# Patient Record
Sex: MALE | Race: WHITE
[De-identification: names, ages, dates, MRNs, and addresses within clinical notes are randomized per-mention and may not be internally consistent; named-entity substitution may affect disease eponyms.]

---

## 2019-08-01 ENCOUNTER — HOSPITAL ENCOUNTER (EMERGENCY)
Age: 49
Discharge: HOME | End: 2019-08-01
Payer: COMMERCIAL

## 2019-08-01 PROCEDURE — 99499 UNLISTED E&M SERVICE: CPT

## 2021-12-18 ENCOUNTER — HOSPITAL ENCOUNTER (OUTPATIENT)
Dept: HOSPITAL 47 - EC | Age: 51
Setting detail: OBSERVATION
LOS: 2 days | Discharge: HOME | End: 2021-12-20
Attending: FAMILY MEDICINE | Admitting: FAMILY MEDICINE
Payer: COMMERCIAL

## 2021-12-18 DIAGNOSIS — Z88.8: ICD-10-CM

## 2021-12-18 DIAGNOSIS — I10: ICD-10-CM

## 2021-12-18 DIAGNOSIS — K76.0: ICD-10-CM

## 2021-12-18 DIAGNOSIS — I72.3: ICD-10-CM

## 2021-12-18 DIAGNOSIS — Z20.822: ICD-10-CM

## 2021-12-18 DIAGNOSIS — T46.5X6A: ICD-10-CM

## 2021-12-18 DIAGNOSIS — Z91.19: ICD-10-CM

## 2021-12-18 DIAGNOSIS — M79.605: ICD-10-CM

## 2021-12-18 DIAGNOSIS — I71.4: ICD-10-CM

## 2021-12-18 DIAGNOSIS — Z91.128: ICD-10-CM

## 2021-12-18 DIAGNOSIS — R79.1: ICD-10-CM

## 2021-12-18 DIAGNOSIS — M79.604: ICD-10-CM

## 2021-12-18 DIAGNOSIS — K80.20: ICD-10-CM

## 2021-12-18 DIAGNOSIS — N28.89: ICD-10-CM

## 2021-12-18 DIAGNOSIS — R79.89: ICD-10-CM

## 2021-12-18 DIAGNOSIS — I16.0: Primary | ICD-10-CM

## 2021-12-18 DIAGNOSIS — D75.89: ICD-10-CM

## 2021-12-18 DIAGNOSIS — M54.9: ICD-10-CM

## 2021-12-18 DIAGNOSIS — I71.01: ICD-10-CM

## 2021-12-18 DIAGNOSIS — Z86.79: ICD-10-CM

## 2021-12-18 LAB
ALBUMIN SERPL-MCNC: 3.4 G/DL (ref 3.5–5)
ALP SERPL-CCNC: 92 U/L (ref 38–126)
ALT SERPL-CCNC: 34 U/L (ref 4–49)
ANION GAP SERPL CALC-SCNC: 12 MMOL/L
APTT BLD: 26.7 SEC (ref 22–30)
AST SERPL-CCNC: 158 U/L (ref 17–59)
BASOPHILS # BLD AUTO: 0 K/UL (ref 0–0.2)
BASOPHILS NFR BLD AUTO: 1 %
BUN SERPL-SCNC: <2 MG/DL (ref 9–20)
CALCIUM SPEC-MCNC: 8.4 MG/DL (ref 8.4–10.2)
CHLORIDE SERPL-SCNC: 106 MMOL/L (ref 98–107)
CO2 SERPL-SCNC: 26 MMOL/L (ref 22–30)
EOSINOPHIL # BLD AUTO: 0.1 K/UL (ref 0–0.7)
EOSINOPHIL NFR BLD AUTO: 2 %
ERYTHROCYTE [DISTWIDTH] IN BLOOD BY AUTOMATED COUNT: 4.28 M/UL (ref 4.3–5.9)
ERYTHROCYTE [DISTWIDTH] IN BLOOD: 15.1 % (ref 11.5–15.5)
GLUCOSE SERPL-MCNC: 127 MG/DL (ref 74–99)
HCT VFR BLD AUTO: 46.3 % (ref 39–53)
HGB BLD-MCNC: 16.1 GM/DL (ref 13–17.5)
INR PPP: 1.2 (ref ?–1.2)
LYMPHOCYTES # SPEC AUTO: 1.7 K/UL (ref 1–4.8)
LYMPHOCYTES NFR SPEC AUTO: 30 %
MCH RBC QN AUTO: 37.7 PG (ref 25–35)
MCHC RBC AUTO-ENTMCNC: 34.9 G/DL (ref 31–37)
MCV RBC AUTO: 108.1 FL (ref 80–100)
MONOCYTES # BLD AUTO: 0.4 K/UL (ref 0–1)
MONOCYTES NFR BLD AUTO: 7 %
NEUTROPHILS # BLD AUTO: 3.2 K/UL (ref 1.3–7.7)
NEUTROPHILS NFR BLD AUTO: 58 %
PLATELET # BLD AUTO: 141 K/UL (ref 150–450)
POTASSIUM SERPL-SCNC: 4 MMOL/L (ref 3.5–5.1)
PROT SERPL-MCNC: 6.6 G/DL (ref 6.3–8.2)
PT BLD: 12.1 SEC (ref 9–12)
SODIUM SERPL-SCNC: 144 MMOL/L (ref 137–145)
WBC # BLD AUTO: 5.5 K/UL (ref 3.8–10.6)

## 2021-12-18 PROCEDURE — 85730 THROMBOPLASTIN TIME PARTIAL: CPT

## 2021-12-18 PROCEDURE — 71275 CT ANGIOGRAPHY CHEST: CPT

## 2021-12-18 PROCEDURE — 83036 HEMOGLOBIN GLYCOSYLATED A1C: CPT

## 2021-12-18 PROCEDURE — 74174 CTA ABD&PLVS W/CONTRAST: CPT

## 2021-12-18 PROCEDURE — 85610 PROTHROMBIN TIME: CPT

## 2021-12-18 PROCEDURE — 96375 TX/PRO/DX INJ NEW DRUG ADDON: CPT

## 2021-12-18 PROCEDURE — 85025 COMPLETE CBC W/AUTO DIFF WBC: CPT

## 2021-12-18 PROCEDURE — 96374 THER/PROPH/DIAG INJ IV PUSH: CPT

## 2021-12-18 PROCEDURE — 80053 COMPREHEN METABOLIC PANEL: CPT

## 2021-12-18 PROCEDURE — 85379 FIBRIN DEGRADATION QUANT: CPT

## 2021-12-18 PROCEDURE — 84484 ASSAY OF TROPONIN QUANT: CPT

## 2021-12-18 PROCEDURE — 36415 COLL VENOUS BLD VENIPUNCTURE: CPT

## 2021-12-18 PROCEDURE — 80048 BASIC METABOLIC PNL TOTAL CA: CPT

## 2021-12-18 PROCEDURE — 93970 EXTREMITY STUDY: CPT

## 2021-12-18 PROCEDURE — 87635 SARS-COV-2 COVID-19 AMP PRB: CPT

## 2021-12-18 PROCEDURE — 99285 EMERGENCY DEPT VISIT HI MDM: CPT

## 2021-12-18 NOTE — CT
EXAMINATION TYPE: CT angio abdomen pelvis

 

DATE OF EXAM: 12/18/2021

 

COMPARISON: None

 

HISTORY: Bilateral leg pain. History of AAA repair.

 

CT DLP: 1329.2 mGycm

Automated exposure control for dose reduction was used.

 

CONTRAST: 

Performed with IV Contrast, patient injected with 100ml mL of Isovue 370.

 

There are 3-D post processed images.

 

There is fatty infiltration of the liver. The bile ducts are not dilated. There are calcified small g
allstones. Spleen is intact. The stomach is intact. There is no pancreatic mass.

 

There is no adrenal mass. Kidneys show satisfactory contrast opacification. There is no hydronephrosi
s. There is 2.6 cm mixed density mass upper pole right kidney. This could be angiomyolipoma.

 

There is no mesenteric edema. There is no ascites or free air. There is no bowel obstruction. Appendi
x is normal.

 

The lumbar vertebra have normal alignment. Posterior elements are intact. There is no compression fra
cture. Bony pelvis is intact.

 

There is dissection of the thoracic aorta and the abdominal aorta. The true lumen is anterior and on 
the right side and relatively small. The dissection extends from the thoracic aorta to the common leeann
ac arteries bilaterally. Dissection extends into the right internal and external iliac arteries as we
ll. There is arterial flow in both femoral arteries. No evidence of hemodynamic stenosis of the femor
al and iliac arteries.

 

There is arterial flow in the superior mesenteric artery. There appears to be thrombosis in the super
ior mesenteric artery 6 cm from the origin. 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 There i
s arterial flow in both renal arteries without evidence of hemodynamic stenosis.

 

IMPRESSION:

There is dissection of the abdominal aorta extending into the iliac arteries bilaterally as above. Th
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

There is there is also dissection extending into the superior mesenteric artery. 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
clusion of the celiac artery.

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
y.

 

Multiple small gallstones. Fatty infiltration of the liver.

 

Mixed density mass appears to contain some fat in the upper pole right kidney could be angiomyolipoma
 or atypical cyst.

## 2021-12-18 NOTE — US
EXAMINATION TYPE: US venous doppler duplex LE 

 

DATE OF EXAM: 12/18/2021 12:58 PM

 

COMPARISON: NONE

 

CLINICAL HISTORY: BLE edema and pain. EC patient with leg pain, especially calf pain; Wears compressi
on stocking left lower leg.

 

SIDE PERFORMED: Bilateral  

 

TECHNIQUE:  The lower extremity deep venous system is examined utilizing real time linear array sonog
segun with graded compression, doppler sonography and color-flow sonography.

 

VESSELS IMAGED:

Common Femoral Vein

Deep Femoral Vein

Greater Saphenous Vein *

Femoral Vein

Popliteal Vein

Small Saphenous Vein *

Proximal Calf Veins

(* superficial vessels)

 

 

Right Leg:  Negative for DVT

 

Left Leg:  Negative for DVT

 

IMPRESSION:  Grayscale, color doppler, spectral doppler imaging performed of the deep veins of the lo
wer extremities.  There is normal flow, compressibility, vascular waveforms

 

No DVT of the bilateral lower extremities.

## 2021-12-18 NOTE — HP
HISTORY AND PHYSICAL



CHIEF COMPLAINT:

Pain in the lower extremities.



HISTORY OF PRESENT ILLNESS:

This is another admission for this gentleman who was treated in the past for

hypertension.  He has a history going back to 2014, when he had a surgical procedure

which probably was related to the ascending aorta.  He might have had a dissection.  He

is unclear of the history and I do not have all the details.  He does have a history of

significant hypertension and has not been seen since May of 2019.  He has been on no

medications of late.  At that time he was on clonidine, losartan, _____, labetalol and

SBE prophylaxis.  He presented to the emergency room with pain in the lower

extremities, where he had no significant evidence of vascular insufficiency, neurologic

insufficiency, swelling, etc.  In the emergency room his blood pressure was 194/109.



REVIEW OF SYSTEMS:

He denies any headache, neurologic deficits, change in the vision or the hearing, chest

pain, cough, hemoptysis, orthopnea, PND, abdominal pain, nausea, vomiting, diarrhea,

melena, hematochezia, jaundice, hepatitis, cirrhosis, hematuria, frequency, urgency,

incontinence, diabetes, etc.



Past medical history, family history, and personal and social histories are all

otherwise essentially unremarkable except for what was already mentioned.  He CANNOT

TAKE HYDRALAZINE.  He has been told in the past that he had a mass on one kidney.  He

is not a smoker.



PHYSICAL EXAMINATION:

Blood pressure is 190/99 with a pulse of 91, respirations of 32.  He is afebrile. In

general he appeared to be well developed, well nourished, in no acute distress.  Skin

color is normal. Skin is warm and dry. Lymph nodes are not enlarged.  Head, ears, eyes,

nose, mouth and throat are normal. Neck veins are not distended.  Thyroid is not

enlarged.  Chest is clear.  Cardiac exam demonstrates sinus tachycardia. The abdomen is

soft and non-tender. Extremities are normal.  There is no edema.  He has normal motor

function and he seems to have palpable pulses. Neurologically he is intact.



He is admitted to the hospital with the diagnoses:

1. Pain in the lower extremities.

2. Hypertension; hypertensive urgency.

3. History of aortic dissection.



PLAN:

1. Bedrest.

2. D-dimer.

3. Control hypertension.

4. Rule out aortic disease.

5. Rule out vascular disease of lower extremities.





MMODL / IJN: [id number] / Job#: [id number]

## 2021-12-18 NOTE — ED
General Adult HPI





<Scar Anaya - Last Filed: 12/18/21 22:37>





- General


Source: patient, RN notes reviewed


Mode of arrival: ambulatory


Limitations: no limitations





<Haritha Brito - Last Filed: 12/18/21 23:33>





- General


Chief complaint: Extremity Injury, Lower


Stated complaint: leg pin


Time Seen by Provider: 12/18/21 11:31





- History of Present Illness


Initial comments: 





51-year-old male presents to the emergency Department with complaints of 

bilateral lower extremity pain that has been an ongoing issue for several 

months, but has worsened over the past week and a half to 2 weeks.  Pain is not 

localized, but is worse in the upper portion of the legs; no aggravating or 

alleviating factors.  Describes pain as a constant discomfort similar to a 

headache or toothache. Able to ambulate without difficulty.  States he does not 

like taking medications therefore has only attempted to treat his symptoms with 

topical agents.  Patient denies injury or trauma, no prolonged immobilization or

recent travel, activity changes, fever or chills, chest pain or shortness of 

breath, abdominal pain, or back pain. (Haritha Brito)





- Related Data


                                Home Medications











 Medication  Instructions  Recorded  Confirmed


 


Aspirin EC [Ecotrin Low Dose] 81 mg PO DAILY PRN 12/18/21 12/18/21











                                    Allergies











Allergy/AdvReac Type Severity Reaction Status Date / Time


 


hydralazine Allergy  Unknown Verified 12/18/21 16:58


 


unknown b/p med AdvReac  joint pain Uncoded 12/18/21 14:21














Review of Systems


ROS Other: All systems not noted in ROS Statement are negative.





<Scar Anaya - Last Filed: 12/18/21 22:37>


ROS Other: All systems not noted in ROS Statement are negative.





<Haritha Brito - Last Filed: 12/18/21 23:33>


ROS Statement: 


Those systems with pertinent positive or pertinent negative responses have been 

documented in the HPI.








Past Medical History


Past Medical History: Hypertension


Additional Past Medical History / Comment(s): AAA


History of Any Multi-Drug Resistant Organisms: None Reported


Additional Past Surgical History / Comment(s): AAA repair


Past Psychological History: No Psychological Hx Reported


Smoking Status: Never smoker


Past Alcohol Use History: Occasional


Past Drug Use History: Marijuana





<Haritha Brito - Last Filed: 12/18/21 23:33>





General Exam


Limitations: no limitations (Well-developed, well-nourished male in no acute 

distress.  Initial temperature 98.1, pulse 108, respirations 18, blood pressure 

193/107, pulse ox 99% on room air)


General appearance: alert, in no apparent distress


Head exam: Present: atraumatic, normocephalic, normal inspection


Neck exam: Present: normal inspection.  Absent: tenderness, meningismus, lym

phadenopathy


Respiratory exam: Present: normal lung sounds bilaterally.  Absent: respiratory 

distress, wheezes, rales, rhonchi, stridor


Cardiovascular Exam: Present: regular rate, normal rhythm, normal heart sounds. 

Absent: systolic murmur, diastolic murmur, rubs, gallop, clicks


GI/Abdominal exam: Present: soft, normal bowel sounds.  Absent: distended, 

tenderness, guarding, rebound, rigid


  ** Left


Hip exam: Present: normal inspection, full ROM.  Absent: tenderness, swelling


Upper Leg exam: Present: normal inspection, full ROM.  Absent: tenderness, 

swelling


Knee exam: Present: normal inspection, full ROM.  Absent: tenderness, swelling


Lower Leg exam: Present: full ROM, swelling (Trace pitting edema of the lower 

leg).  Absent: tenderness, erythema, Homans' sign


Ankle exam: Present: normal inspection, full ROM.  Absent: tenderness, swelling


Foot/Toe exam: Present: normal inspection, full ROM.  Absent: tenderness, 

swelling


Neurovascular tendon exam: Present: no vascular compromise.  Absent: pulse 

deficit, abnormal cap refill, motor deficit, sensory deficit





  ** Right


Hip exam: Present: normal inspection, full ROM.  Absent: tenderness, swelling


Upper Leg exam: Present: normal inspection, full ROM.  Absent: tenderness, 

swelling


Knee exam: Present: normal inspection, full ROM.  Absent: tenderness, swelling


Lower Leg exam: Present: full ROM, swelling (Trace pitting edema of the lower 

leg).  Absent: tenderness, erythema, Homans' sign


Ankle exam: Present: normal inspection, full ROM.  Absent: tenderness, swelling


Foot/Toe exam: Present: normal inspection, full ROM.  Absent: tenderness, 

swelling


Neurovascular tendon exam: Present: no vascular compromise.  Absent: pulse 

deficit, abnormal cap refill, motor deficit, sensory deficit


Back exam: Present: normal inspection.  Absent: tenderness


Neurological exam: Present: alert, oriented X3, CN II-XII intact


Psychiatric exam: Present: normal affect, normal mood


Skin exam: Present: warm, dry, intact, normal color.  Absent: rash





<Haritha Brito - Last Filed: 12/18/21 23:33>





Course





<Scar Anaya - Last Filed: 12/18/21 22:37>





<Haritha Brito - Last Filed: 12/18/21 23:33>


                                   Vital Signs











  12/18/21 12/18/21 12/18/21





  11:15 12:59 16:48


 


Temperature 98.1 F  


 


Pulse Rate 108 H 95 101 H


 


Respiratory 18 19 19





Rate   


 


Blood Pressure 193/107 190/109 196/110


 


O2 Sat by Pulse 99 98 98





Oximetry   














  12/18/21 12/18/21 12/18/21





  17:49 18:35 18:45


 


Temperature   


 


Pulse Rate  81 81


 


Respiratory   





Rate   


 


Blood Pressure 177/107 166/103 166/103


 


O2 Sat by Pulse  94 L 95





Oximetry   














  12/18/21 12/18/21 12/18/21





  19:00 19:15 19:30


 


Temperature   


 


Pulse Rate 75 76 78


 


Respiratory 18 17 18





Rate   


 


Blood Pressure 142/86 120/90 145/88


 


O2 Sat by Pulse 93 L 93 L 92 L





Oximetry   














  12/18/21 12/18/21 12/18/21





  20:00 20:30 21:00


 


Temperature   


 


Pulse Rate 78 77 81


 


Respiratory 16  17





Rate   


 


Blood Pressure 138/94  134/81


 


O2 Sat by Pulse 95 92 L 95





Oximetry   














  12/18/21





  21:30


 


Temperature 


 


Pulse Rate 85


 


Respiratory 17





Rate 


 


Blood Pressure 134/82


 


O2 Sat by Pulse 95





Oximetry 














- Reevaluation(s)


Reevaluation #1: 





12/18/21 19:13


I became involved in the patient's care after CT angiogram chest report was 

received and discussed with me by ESTELLA Brito.  CT showed thoracic aorta 

dissection.  I discussed these findings with the radiologist that read the 

study, and he recommended obtaining a CT angiogram abdomen/pelvis for further 

evaluation.  The patient's blood pressure was further managed with IV labetalol 

while this CT was being obtained.  Upon receiving the CT angiogram 

abdomen/pelvis findings, Dr. Neumann (vascular surgery) was called and the 

patient's case and imaging findings were discussed with him myself.  Dr. Neumann reviewed the patient's imaging studies himself.  He recommended 

transferring the patient to [facility] where the patient 

had his prior aortic dissection repair surgery.


12/18/21 19:44


Patient's case and imaging findings today were discussed with Dr. Green at the 

[facility].  He states that he feels that the patient's 

imaging findings today are likely chronic when he compares them to the imaging 

findings that he has on record through the [facility] system.  He 

asks that we forward our images to them, so he can compare the imaging studies 

himself, and he states that he will then call back with his recommendations.


12/18/21 20:34


Still awaiting callback from [facility].  Patient's blood 

pressure has currently improved to 120/90.  Patient denies development of any 

new pain or symptoms while in the ED.


12/18/21 21:48


[facility] AOD Dr. Marylou Ocampo just called me and she sta

casandra that she electronically received the patient's study images from today.  She

states that the patient's images were reviewed by their cardiothoracic surgeon, 

Dr. Bates, and compared to the most recent images that they have on record for 

the patient. She states that all the patient's imaging findings are chronic and 

unchanged with regards to his dissections.  They do not feel that there is any r

nataliia to transfer the patient out of our facility due to these findings.


12/18/21 22:08


Dr. Long was contacted and updated about all of the above.  He states that he

feels comfortable keeping the patient here at our hospital and managing and 

patient himself as originally planned.  He has no further recommendations at 

this time. (Scar Anaya)





12/18/21 11:57


Discussed elevated blood pressure and heart rate with patient who states he is 

not surprised.  He acknowledges that he is supposed to be taking blood pressure 

medications, however states he has not been to the doctor in a year or more.


12/18/21 14:30


Spoke with Dr. Long regarding this patient's elevated blood pressure and 

medication noncompliance.  He prefers to admit this patient for observation in 

effort to get blood pressure under control.  Does request the patient has a d-

dimer and follow-up studies as necessary given his history.  Oral 

antihypertensive medications ordered per Dr. Long's direction.  Plan of care 

discussed with patient.  He is agreeable.


12/18/21 17:44


CT report was reviewed showing some alarming findings therefore spoke with my 

attending  who assisted in for the evaluation and treatment of this 

patient.  Patient reassessed with physician at bedside and continues to insist 

that he has no neck, back, chest, or abdominal pain.  Reports discomfort in his 

legs has improved considerably.


 (Haritha Brito)





Medical Decision Making





- Lab Data


Result diagrams: 


                                 12/18/21 12:03





                                 12/18/21 12:03





<Scar Anaya - Last Filed: 12/18/21 22:37>





- Lab Data


Result diagrams: 


                                 12/18/21 12:03





                                 12/18/21 12:03





- Radiology Data


Radiology results: report reviewed, image reviewed





<Haritha Brito - Last Filed: 12/18/21 23:33>





- Medical Decision Making





This is a 51-year-old male with a history of AAA repair and hypertension who 

presents to the emergency Department with complaints of bilateral lower 

extremity pain.  Patient does not take any medications and has had poor 

compliance with medical care due to issues with insurance.  Upon exam, patient 

is well-appearing and in no acute distress.  Lung sounds are clear; heart rate 

is regular. He is constantly moving his legs in attempt to find a position of 

comfort.  Trace pitting lower extremity edema noted bilaterally.  Pedal pulses 

+2 bilateral.  Patient is ambulating without difficulty.  Patient is noted to be

hypertensive (193/107) which was discussed with patient and he states he is no 

longer taking any medications.  Also spoke with patient about his past medical h

istory significant for an aortic dissection; he states this occurred 7 years ago

and his care was done at [facility].  Laboratory studies were 

obtained showing macrocytosis, elevated total bilirubin and AST, though patient 

does admit to drinking EtOH last night, and an elevated d-dimer.  CTs of the 

chest, abdomen, and pelvis had findings concerning extensive aortic dissection; 

this was determined to be chronic (as detailed above).  Physical exam findings 

support this conclusion. Anti-hypertensives given with improvement. Leg pain 

improved with meds. Patient is admitted to this hospital for further evaluation 

and treatment.  This patient's care was facilitated in cooperation with my 

attending Dr. Anaya.  (Haritha Brito)





- Lab Data


                                   Lab Results











  12/18/21 12/18/21 Range/Units





  12:03 12:03 


 


WBC  5.5   (3.8-10.6)  k/uL


 


RBC  4.28 L   (4.30-5.90)  m/uL


 


Hgb  16.1   (13.0-17.5)  gm/dL


 


Hct  46.3   (39.0-53.0)  %


 


MCV  108.1 H   (80.0-100.0)  fL


 


MCH  37.7 H   (25.0-35.0)  pg


 


MCHC  34.9   (31.0-37.0)  g/dL


 


RDW  15.1   (11.5-15.5)  %


 


Plt Count  141 L   (150-450)  k/uL


 


MPV  7.5   


 


Neutrophils %  58   %


 


Lymphocytes %  30   %


 


Monocytes %  7   %


 


Eosinophils %  2   %


 


Basophils %  1   %


 


Neutrophils #  3.2   (1.3-7.7)  k/uL


 


Lymphocytes #  1.7   (1.0-4.8)  k/uL


 


Monocytes #  0.4   (0-1.0)  k/uL


 


Eosinophils #  0.1   (0-0.7)  k/uL


 


Basophils #  0.0   (0-0.2)  k/uL


 


Macrocytosis  Marked A   


 


Sodium   144  (137-145)  mmol/L


 


Potassium   4.0  (3.5-5.1)  mmol/L


 


Chloride   106  ()  mmol/L


 


Carbon Dioxide   26  (22-30)  mmol/L


 


Anion Gap   12  mmol/L


 


BUN   <2 L  (9-20)  mg/dL


 


Creatinine   0.82  (0.66-1.25)  mg/dL


 


Est GFR (CKD-EPI)AfAm   >90  (>60 ml/min/1.73 sqM)  


 


Est GFR (CKD-EPI)NonAf   >90  (>60 ml/min/1.73 sqM)  


 


Glucose   127 H  (74-99)  mg/dL


 


Calcium   8.4  (8.4-10.2)  mg/dL


 


Total Bilirubin   3.0 H  (0.2-1.3)  mg/dL


 


AST   158 H  (17-59)  U/L


 


ALT   34  (4-49)  U/L


 


Alkaline Phosphatase   92  ()  U/L


 


Total Protein   6.6  (6.3-8.2)  g/dL


 


Albumin   3.4 L  (3.5-5.0)  g/dL














- Radiology Data


Venous Doppler study was obtained of the bilateral lower extremities.  Report 

was reviewed in its entirety.  Impression per Dr. Sandra is normal flow, 

compressibility, and vascular waveforms.  No DVT of the bilateral lower 

extremities.


CT chest anterior was obtained.  Report was reviewed in its entirety.  Im

pression per Dr. Kwong is there is dissecting thoracic aortic aneurysm 

extending from the left subclavian artery and involving the left subclavian 

artery.  Lower extent of the dissection not included on the exam.  The 

dissection extends below the diaphragm.  No evidence of pulmonary embolism.  Fa

tty infiltration of the liver.


CT angiogram of the and pelvis was obtained.  Report was reviewed in its 

entirety.  Impression per Dr. Kwong is dissection of the abdominal aorta 

extending into the iliac arteries bilaterally as above.  There is 2.6 cm 

aneurysm of the left common iliac artery. There is also dissection extending 

into the superior mesenteric artery.  There is subtotal occlusion of the celiac 

artery.  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

superior mesenteric artery.  Multiple gallstones.  Fatty infiltration of the 

liver.  Extensive a mass appears to contain some fat in the upper pole of the 

right kidney could be angiomyolipoma or atypical cyst.





 (Haritha Brito)





Disposition





<Scar Anaya - Last Filed: 12/18/21 22:37>


Decision Time: 14:30





<Haritha Brito - Last Filed: 12/18/21 23:33>


Clinical Impression: 


 Leg pain, bilateral, Hypertensive urgency





Disposition: ADMITTED AS IP TO THIS HOSP


Condition: Serious

## 2021-12-18 NOTE — CT
EXAMINATION TYPE: CT chest angio for PE

 

DATE OF EXAM: 12/18/2021

 

COMPARISON: None

 

HISTORY: Elevated d-dimer and bilateral leg pain. History of AAA repair.

 

CT DLP: 829.2 mGycm

Automated exposure control for dose reduction was used.

 

CONTRAST: 

Performed with IV Contrast, patient injected with 100ml mL of Isovue 370.

 

Images obtained from the thoracic inlet to the diaphragm with IV contrast. There are 3-D post process
ed images.

 

The lungs are clear of infiltrate. There is no pleural effusion. There is no pericardial effusion. He
art size is fairly normal.

 

There is dissection of the thoracic aorta extending from the top of the aortic arch and involving the
 entire descending thoracic aorta extending into the abdominal aorta. The true lumen is anterior and 
small. There is dissection extending into the left subclavian artery. The innominate artery and the l
eft common carotid artery show no sign of dissection. The aortic arch measures 4.1 cm.

 

There is no evidence of filling defect in the pulmonary arteries. There is some fatty infiltration of
 the liver. Lasix spine is intact. Sternum is intact. There are sternal wires.

 

IMPRESSION:

There is dissecting thoracic aortic aneurysm extending from the left subclavian artery and involving 
the left subclavian artery. Lower extent of the dissection not included on the exam. The dissection e
xtends below the diaphragm.

 

No evidence of pulmonary embolism. Fatty infiltration of the liver.

## 2021-12-19 LAB
ANION GAP SERPL CALC-SCNC: 8 MMOL/L
BASOPHILS # BLD AUTO: 0 K/UL (ref 0–0.2)
BASOPHILS NFR BLD AUTO: 0 %
BUN SERPL-SCNC: 2 MG/DL (ref 9–20)
CALCIUM SPEC-MCNC: 8.5 MG/DL (ref 8.4–10.2)
CHLORIDE SERPL-SCNC: 102 MMOL/L (ref 98–107)
CO2 SERPL-SCNC: 25 MMOL/L (ref 22–30)
EOSINOPHIL # BLD AUTO: 0 K/UL (ref 0–0.7)
EOSINOPHIL NFR BLD AUTO: 1 %
ERYTHROCYTE [DISTWIDTH] IN BLOOD BY AUTOMATED COUNT: 3.99 M/UL (ref 4.3–5.9)
ERYTHROCYTE [DISTWIDTH] IN BLOOD: 14.2 % (ref 11.5–15.5)
GLUCOSE SERPL-MCNC: 123 MG/DL (ref 74–99)
HCT VFR BLD AUTO: 42.5 % (ref 39–53)
HGB BLD-MCNC: 15 GM/DL (ref 13–17.5)
LYMPHOCYTES # SPEC AUTO: 0.7 K/UL (ref 1–4.8)
LYMPHOCYTES NFR SPEC AUTO: 11 %
MCH RBC QN AUTO: 37.6 PG (ref 25–35)
MCHC RBC AUTO-ENTMCNC: 35.3 G/DL (ref 31–37)
MCV RBC AUTO: 106.4 FL (ref 80–100)
MONOCYTES # BLD AUTO: 0.3 K/UL (ref 0–1)
MONOCYTES NFR BLD AUTO: 5 %
NEUTROPHILS # BLD AUTO: 4.9 K/UL (ref 1.3–7.7)
NEUTROPHILS NFR BLD AUTO: 82 %
PLATELET # BLD AUTO: 113 K/UL (ref 150–450)
POTASSIUM SERPL-SCNC: 3.9 MMOL/L (ref 3.5–5.1)
SODIUM SERPL-SCNC: 135 MMOL/L (ref 137–145)
WBC # BLD AUTO: 6 K/UL (ref 3.8–10.6)

## 2021-12-19 RX ADMIN — LABETALOL HCL SCH MG: 100 TABLET, FILM COATED ORAL at 08:31

## 2021-12-19 RX ADMIN — DOXAZOSIN MESYLATE SCH MG: 4 TABLET ORAL at 12:36

## 2021-12-19 NOTE — PN
PROGRESS NOTE



CHIEF COMPLAINT:

Back and leg pain with hypertensive urgency.



HISTORY OF PRESENT ILLNESS:

This gentleman is doing better.  He is not having any chest pain, shortness of breath,

abdominal pain, etc.  The pains that he has been getting, apparently, are shooting

pains in the legs.



PHYSICAL EXAMINATION:

Chest is clear.  Cardiac exam is normal.  Abdomen is protuberant, soft and nontender.

Extremities are normal.



IMPRESSION:

1. Back and leg pain, etiology unknown.

2. Hypertensive urgency.

3. History of aortic dissection.



PLAN:

Increase medication program in an effort to drive his blood pressure into normal range.

Once that is accomplished, he can be discharged.





MMODL / IJN: [id number] / Job#: [id number]

## 2021-12-20 VITALS — DIASTOLIC BLOOD PRESSURE: 84 MMHG | HEART RATE: 66 BPM | SYSTOLIC BLOOD PRESSURE: 144 MMHG | RESPIRATION RATE: 18 BRPM

## 2021-12-20 VITALS — TEMPERATURE: 97.9 F

## 2021-12-20 RX ADMIN — DOXAZOSIN MESYLATE SCH MG: 4 TABLET ORAL at 09:04

## 2021-12-20 RX ADMIN — LABETALOL HCL SCH MG: 100 TABLET, FILM COATED ORAL at 09:04

## 2021-12-20 NOTE — DS
DISCHARGE SUMMARY



CHIEF COMPLAINT:

Back pain and dysesthesias in the legs.



HISTORY OF PRESENT ILLNESS AND PHYSICAL EXAMINATION:

Details of this man's history and physical can be found in the initial workup.



LABORATORY STUDIES:

While he was in the hospital he had laboratory studies, details of which can be found

in the laboratory section of his chart.



COURSE IN THE HOSPITAL:

After admission he was placed on bedrest, started on intravenous fluids, and efforts

were made to bring his blood pressure down as quickly as possible.  There was concern

that he may have an aortic dissection, but his suggested that there had been no change

since his last evaluation several years ago.  In the hospital he did well. Blood

pressures were brought fairly quickly.  He had no further difficulty with back pain,

dysesthesias in the legs, etc.  It was felt that he could be discharged, and he will be

followed up in the office in several days.



FINAL DIAGNOSIS:

1. Hypertensive urgency.

2. History of aortic dissection.



OPERATIONS:

None.



CONSULTATION:

Cardiology.



He is improved.





MMODL / IJN: [id number] / Job#: [id number]

## 2021-12-29 ENCOUNTER — HOSPITAL ENCOUNTER (EMERGENCY)
Dept: HOSPITAL 47 - EC | Age: 51
LOS: 1 days | Discharge: HOME | End: 2021-12-30
Payer: COMMERCIAL

## 2021-12-29 VITALS — RESPIRATION RATE: 18 BRPM

## 2021-12-29 DIAGNOSIS — F12.90: ICD-10-CM

## 2021-12-29 DIAGNOSIS — K80.50: Primary | ICD-10-CM

## 2021-12-29 DIAGNOSIS — I10: ICD-10-CM

## 2021-12-29 DIAGNOSIS — R07.89: ICD-10-CM

## 2021-12-29 DIAGNOSIS — Z79.82: ICD-10-CM

## 2021-12-29 LAB
ALBUMIN SERPL-MCNC: 3.2 G/DL (ref 3.5–5)
ALP SERPL-CCNC: 89 U/L (ref 38–126)
ALT SERPL-CCNC: 24 U/L (ref 4–49)
ANION GAP SERPL CALC-SCNC: 10 MMOL/L
APTT BLD: 25.8 SEC (ref 22–30)
AST SERPL-CCNC: 110 U/L (ref 17–59)
BASOPHILS # BLD AUTO: 0 K/UL (ref 0–0.2)
BASOPHILS NFR BLD AUTO: 1 %
BUN SERPL-SCNC: 4 MG/DL (ref 9–20)
CALCIUM SPEC-MCNC: 8.5 MG/DL (ref 8.4–10.2)
CHLORIDE SERPL-SCNC: 105 MMOL/L (ref 98–107)
CO2 SERPL-SCNC: 26 MMOL/L (ref 22–30)
EOSINOPHIL # BLD AUTO: 0.1 K/UL (ref 0–0.7)
EOSINOPHIL NFR BLD AUTO: 1 %
ERYTHROCYTE [DISTWIDTH] IN BLOOD BY AUTOMATED COUNT: 3.89 M/UL (ref 4.3–5.9)
ERYTHROCYTE [DISTWIDTH] IN BLOOD: 13.8 % (ref 11.5–15.5)
GLUCOSE SERPL-MCNC: 136 MG/DL (ref 74–99)
HCT VFR BLD AUTO: 42.3 % (ref 39–53)
HGB BLD-MCNC: 14.7 GM/DL (ref 13–17.5)
INR PPP: 1.1 (ref ?–1.2)
LYMPHOCYTES # SPEC AUTO: 0.9 K/UL (ref 1–4.8)
LYMPHOCYTES NFR SPEC AUTO: 10 %
MAGNESIUM SPEC-SCNC: 1.4 MG/DL (ref 1.6–2.3)
MCH RBC QN AUTO: 37.7 PG (ref 25–35)
MCHC RBC AUTO-ENTMCNC: 34.6 G/DL (ref 31–37)
MCV RBC AUTO: 108.9 FL (ref 80–100)
MONOCYTES # BLD AUTO: 0.3 K/UL (ref 0–1)
MONOCYTES NFR BLD AUTO: 4 %
NEUTROPHILS # BLD AUTO: 7.5 K/UL (ref 1.3–7.7)
NEUTROPHILS NFR BLD AUTO: 84 %
PLATELET # BLD AUTO: 192 K/UL (ref 150–450)
POTASSIUM SERPL-SCNC: 4.3 MMOL/L (ref 3.5–5.1)
PROT SERPL-MCNC: 6.3 G/DL (ref 6.3–8.2)
PT BLD: 11.8 SEC (ref 9–12)
SODIUM SERPL-SCNC: 141 MMOL/L (ref 137–145)
WBC # BLD AUTO: 8.9 K/UL (ref 3.8–10.6)

## 2021-12-29 PROCEDURE — 76705 ECHO EXAM OF ABDOMEN: CPT

## 2021-12-29 PROCEDURE — 83690 ASSAY OF LIPASE: CPT

## 2021-12-29 PROCEDURE — 99285 EMERGENCY DEPT VISIT HI MDM: CPT

## 2021-12-29 PROCEDURE — 96375 TX/PRO/DX INJ NEW DRUG ADDON: CPT

## 2021-12-29 PROCEDURE — 85730 THROMBOPLASTIN TIME PARTIAL: CPT

## 2021-12-29 PROCEDURE — 93005 ELECTROCARDIOGRAM TRACING: CPT

## 2021-12-29 PROCEDURE — 84484 ASSAY OF TROPONIN QUANT: CPT

## 2021-12-29 PROCEDURE — 85610 PROTHROMBIN TIME: CPT

## 2021-12-29 PROCEDURE — 85379 FIBRIN DEGRADATION QUANT: CPT

## 2021-12-29 PROCEDURE — 96365 THER/PROPH/DIAG IV INF INIT: CPT

## 2021-12-29 PROCEDURE — 71046 X-RAY EXAM CHEST 2 VIEWS: CPT

## 2021-12-29 PROCEDURE — 36415 COLL VENOUS BLD VENIPUNCTURE: CPT

## 2021-12-29 PROCEDURE — 71275 CT ANGIOGRAPHY CHEST: CPT

## 2021-12-29 PROCEDURE — 80053 COMPREHEN METABOLIC PANEL: CPT

## 2021-12-29 PROCEDURE — 85025 COMPLETE CBC W/AUTO DIFF WBC: CPT

## 2021-12-29 PROCEDURE — 83735 ASSAY OF MAGNESIUM: CPT

## 2021-12-29 PROCEDURE — 96361 HYDRATE IV INFUSION ADD-ON: CPT

## 2021-12-29 PROCEDURE — 74177 CT ABD & PELVIS W/CONTRAST: CPT

## 2021-12-29 RX ADMIN — MAGNESIUM SULFATE IN DEXTROSE SCH MLS/HR: 10 INJECTION, SOLUTION INTRAVENOUS at 22:52

## 2021-12-29 NOTE — XR
EXAMINATION TYPE: XR chest 2V

 

DATE OF EXAM: 12/29/2021

 

COMPARISON: NONE

 

HISTORY: Chest pain

 

TECHNIQUE:  Frontal and lateral views of the chest are obtained.

 

FINDINGS:  

 

There is no focal air space opacity.

 

No evidence for pneumothorax.  No pleural effusion.

 

The cardiac silhouette size is within normal limits.

 

The osseous structures are grossly intact.

 

IMPRESSION:  

 

1.  No acute cardiopulmonary process.

## 2021-12-29 NOTE — CT
EXAMINATION TYPE: CT angio chest

 

DATE OF EXAM: 12/29/2021

 

COMPARISON: 12/18/2021

 

HISTORY: CP, abdominal pain. Cardiac hx

 

CT DLP: 2871.4 mGycm

Automated exposure control for dose reduction was used.

 

CONTRAST: 

Performed with IV Contrast, patient injected with 100 mL of Isovue 370.

 

Images obtained from the thoracic inlet to the diaphragm with IV contrast. There are Three-D postproc
essed images.

 

The lungs are clear of infiltrate. There is no pleural effusion. Heart size is normal. There is no pe
ricardial effusion.

 

There is no mediastinal adenopathy. There is differential enhancement of the aortic arch and the desc
ending thoracic aorta related to dissection that is extending from the top of the arch to the upper a
bdominal aorta. The false lumen is posterior.

 

There is normal contrast opacification of the pulmonary arteries. There are no filling defects. There
 is some fatty infiltration of the liver.

 

IMPRESSION:

No evidence of pulmonary embolism. 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
f the aortic arch at the left subclavian artery to the abdomen without change in appearance compared 
to old exam. There is no significant aneurysm.

## 2021-12-29 NOTE — ED
Chest Pain HPI





- General


Chief Complaint: Chest Pain


Stated Complaint: Chest Pain


Time Seen by Provider: 12/29/21 21:36


Source: patient, RN notes reviewed, old records reviewed


Mode of arrival: ambulatory


Limitations: no limitations





- History of Present Illness


Initial Comments: 





This is a 51-year-old male to the ER for evaluation today.  Today's presents 

today for evaluation regards to bloating abdominal pain epigastric abdominal 

pain with nausea no vomiting no travel history no sick contacts.  Patient has no

prior history of similar complaint.  No prior surgical history does have history

of AAA with AAA repair.  She has high blood pressure


MD Complaint: chest pain


-: hour(s)


Onset: during rest


Pain Location: substernal, epigastric


Pain Radiation: back


Severity: moderate


Severity scale (1-10): 4


Quality: aching, sharp


Consistency: intermittent


Improves With: nothing


Worsens With: nothing


Anginal Symptoms: nausea


Other Symptoms: acid taste in mouth


Treatments Prior to Arrival: none





- Related Data


                                Home Medications











 Medication  Instructions  Recorded  Confirmed


 


Aspirin EC [Ecotrin Low Dose] 81 mg PO DAILY PRN 12/18/21 12/29/21








                                  Previous Rx's











 Medication  Instructions  Recorded


 


Doxazosin [Cardura] 4 mg PO DAILY #30 tab 12/20/21


 


Labetalol [Trandate] 300 mg PO DAILY #30 tab 12/20/21


 


cloNIDine HCL [Catapres] 0.3 mg PO TID #90 tab 12/20/21


 


lisinopriL [Zestril] 40 mg PO DAILY #30 tab 12/20/21











                                    Allergies











Allergy/AdvReac Type Severity Reaction Status Date / Time


 


hydralazine Allergy  Unknown Verified 12/29/21 23:05


 


unknown b/p med AdvReac  joint pain Uncoded 12/29/21 20:16














Review of Systems


ROS Statement: 


Those systems with pertinent positive or pertinent negative responses have been 

documented in the HPI.





ROS Other: All systems not noted in ROS Statement are negative.





EKG Findings





- EKG Comments:


EKG Findings:: EKG is sinus rhythm 67  l QRS 92 





Past Medical History


Past Medical History: Hypertension


Additional Past Medical History / Comment(s): AAA to thorasis level


History of Any Multi-Drug Resistant Organisms: None Reported


Additional Past Surgical History / Comment(s): AAA repair - 7 years ago at [facility]


Past Anesthesia/Blood Transfusion Reactions: No Reported Reaction


Past Psychological History: No Psychological Hx Reported


Smoking Status: Never smoker


Past Alcohol Use History: Occasional


Past Drug Use History: Marijuana





General Exam


Limitations: no limitations


General appearance: alert, in no apparent distress, obese


Head exam: Present: atraumatic, normocephalic, normal inspection


Eye exam: Present: normal appearance, PERRL, EOMI.  Absent: scleral icterus, 

conjunctival injection, periorbital swelling


ENT exam: Present: normal exam, mucous membranes moist


Neck exam: Present: normal inspection.  Absent: tenderness, meningismus, 

lymphadenopathy


Respiratory exam: Present: normal lung sounds bilaterally.  Absent: respiratory 

distress, wheezes, rales, rhonchi, stridor


Cardiovascular Exam: Present: regular rate, normal rhythm, normal heart sounds. 

 Absent: systolic murmur, diastolic murmur, rubs, gallop, clicks


GI/Abdominal exam: Present: soft, tenderness (Epigastric right upper quadrant), 

normal bowel sounds.  Absent: distended, guarding, rebound, rigid


Extremities exam: Present: normal inspection, full ROM, normal capillary refill.

  Absent: tenderness, pedal edema, joint swelling, calf tenderness


Back exam: Present: normal inspection


Neurological exam: Present: alert, oriented X3, CN II-XII intact


Psychiatric exam: Present: normal affect, normal mood


Skin exam: Present: warm, dry, intact, normal color.  Absent: rash





Course


                                   Vital Signs











  12/29/21 12/29/21 12/30/21





  20:11 22:50 00:30


 


Temperature 98.7 F  


 


Pulse Rate 66 79 79


 


Respiratory 22 18 18





Rate   


 


Blood Pressure 171/95 199/114 164/94


 


O2 Sat by Pulse 97  98





Oximetry   














  12/30/21 12/30/21





  00:50 02:00


 


Temperature  


 


Pulse Rate 78 75


 


Respiratory 18 18





Rate  


 


Blood Pressure 178/102 149/81


 


O2 Sat by Pulse 97 97





Oximetry  














- Reevaluation(s)


Reevaluation #1: 





12/30/21 03:00


Medical record is reviewed


Reevaluation #2: 





12/30/21 03:00


Patient resting comfortably, sleeping in,


Reevaluation #3: 





12/30/21 03:00


Patient informed results and questions answered


Reevaluation #4: 





12/30/21 03:00


Patient states he feels good for recurrent discharge





Chest Pain MDM





- MDM





51 male to the emergency department for evaluation today.  Patient coming in for

 evaluation abdominal pain bloating fullness indigestion type symptoms.  Patient

 does appear to have biliary colic will prefer outpatient testing going forward 

and patient can be discharged home





Disposition


Clinical Impression: 


 Atypical chest pain, Biliary colic





Disposition: HOME SELF-CARE


Condition: Good


Instructions (If sedation given, give patient instructions):  Biliary Colic (ED)


Is patient prescribed a controlled substance at d/c from ED?: No


Referrals: 


Denys Long MD [Primary Care Provider] - 1-2 days

## 2021-12-29 NOTE — CT
EXAMINATION TYPE: CT abdomen pelvis w con

 

DATE OF EXAM: 12/29/2021

 

COMPARISON: 12/18/2021

 

HISTORY: CP, abdominal pain. Cardiac hx

 

CT DLP: 2871.4 mGycm

Automated exposure control for dose reduction was used.

 

CONTRAST: 

Performed with IV Contrast, patient injected with 100 mL of Isovue 370.

 

 

Images obtained from the diaphragm to the floor of the pelvis with IV contrast.

 

Lung bases are clear. There is no pleural effusion. Heart size is normal. There is no pericardial eff
usion. There is dissection of the lower thoracic aorta and the abdominal aorta extending to the bifur
cation and into the common iliac arteries bilaterally. There is insufficient contrast for good evalua
tion of the dissection.

 

There is some fatty infiltration of the liver. Liver and spleen are intact. There is no pancreatic ma
ss. The stomach is intact. The bile ducts are not dilated. There are multiple calcified gallstones.

 

There is no adrenal mass. There is mixed density mass in the upper pole right kidney that measures 3.
3 cm. This appears to contain some fat and is probably an angiomyolipoma.

 

There is no hydronephrosis. Kidneys have normal size. There is no retroperitoneal adenopathy. Bladder
 distends smoothly. Ureters are not dilated. There is no inguinal hernia. There is no free fluid in t
he pelvis.

 

There is no mesenteric edema. There is no ascites or free air. Appendix is small.

 

The lumbar vertebrae have normal alignment. There is no compression fracture. The bony pelvis is inta
ct. The hip joints are intact. 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
 and is involved with the arterial dissection.

 

IMPRESSION:

There is abdominal aortic dissection extending into the common iliac arteries and not changed in appe
arance compared to recent CT scan.

 

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

 

Cholelithiasis.

 

Fatty infiltration of the liver. 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

## 2021-12-30 VITALS — HEART RATE: 78 BPM

## 2021-12-30 VITALS — TEMPERATURE: 98.1 F | DIASTOLIC BLOOD PRESSURE: 84 MMHG | SYSTOLIC BLOOD PRESSURE: 149 MMHG

## 2021-12-30 RX ADMIN — MAGNESIUM SULFATE IN DEXTROSE SCH MLS/HR: 10 INJECTION, SOLUTION INTRAVENOUS at 00:08

## 2021-12-30 NOTE — US
EXAMINATION TYPE: US gallbladder

 

DATE OF EXAM:   12/30/2021

 

COMPARISON: CT

 

CLINICAL HISTORY: pain. Pain.

 

EXAM MEASUREMENTS:

 

Liver Length:  19.5 cm   

Gallbladder Wall:  0.32 cm   

CBD:  0.45 cm

Right Kidney:  8.7 x 4.7 x 4.8 cm 

 

Limited due to gas and patient body habitus.

 

Pancreas:  Not well seen.

Liver:  Appears enlarged and coarse with increased echogenicity. Limited.  

Gallbladder:  Appears enlarged measuring 11.2 cm in length and 4.5 cm in width. Wall measures upper l
imits of normal. Probable hyperechoic foci within neck, limited visibility. 

**Evidence for sonographic Santana's sign:  No.

CBD:  Portions seen appear wnl. 

Right Kidney: Appears slightly small in size. Limited visibility.   

 

 

 

IMPRESSION:

Fatty infiltration of the liver. Mildly dilated gallbladder suggestive of gallbladder dysfunction or 
cholecystitis. Cholelithiasis. No dilated ducts.

## 2022-03-27 ENCOUNTER — HOSPITAL ENCOUNTER (EMERGENCY)
Dept: HOSPITAL 47 - EC | Age: 52
Discharge: HOME | End: 2022-03-27
Payer: COMMERCIAL

## 2022-03-27 VITALS
TEMPERATURE: 98 F | DIASTOLIC BLOOD PRESSURE: 56 MMHG | HEART RATE: 89 BPM | RESPIRATION RATE: 18 BRPM | SYSTOLIC BLOOD PRESSURE: 121 MMHG

## 2022-03-27 DIAGNOSIS — I10: ICD-10-CM

## 2022-03-27 DIAGNOSIS — Z88.8: ICD-10-CM

## 2022-03-27 DIAGNOSIS — R20.2: Primary | ICD-10-CM

## 2022-03-27 PROCEDURE — 99283 EMERGENCY DEPT VISIT LOW MDM: CPT

## 2022-03-27 NOTE — ED
Lower Extremity Injury HPI





- General


Chief Complaint: Extremity Injury, Lower


Stated Complaint: pain in legs


Time Seen by Provider: 03/27/22 02:43


Source: patient, RN notes reviewed, old records reviewed


Mode of arrival: ambulatory


Limitations: no limitations





- History of Present Illness


Initial Comments: 





This is a 52-year-old male to the emergency department for evaluation patient 

presents today for evaluation regards to bilateral lower extremity pain and 

numbness and tingling.  Patient is able to ambulate able to drink without 

difficulty does admit to drinking alcohol today for the pain.  No abdominal pain

and no other real significant complaints.


MD Complaint: other (biLateral leg numbness and tingling)


Injury: Leg: Right, Left


Place: home


Severity: mild


Severity scale (1-10): 3


Improves With: nothing


Worsens With: nothing


Context: other (none)


Other Symptoms: other (none)


Associated Symptoms: tingling


Treatments Prior to Arrival: NSAIDS





- Related Data


                                Home Medications











 Medication  Instructions  Recorded  Confirmed


 


Aspirin EC [Ecotrin Low Dose] 81 mg PO DAILY PRN 12/18/21 12/29/21








                                  Previous Rx's











 Medication  Instructions  Recorded


 


Doxazosin [Cardura] 4 mg PO DAILY #30 tab 12/20/21


 


Labetalol [Trandate] 300 mg PO DAILY #30 tab 12/20/21


 


cloNIDine HCL [Catapres] 0.3 mg PO TID #90 tab 12/20/21


 


lisinopriL [Zestril] 40 mg PO DAILY #30 tab 12/20/21


 


Cyclobenzaprine [Flexeril] 10 mg PO HS #30 tab 03/27/22











                                    Allergies











Allergy/AdvReac Type Severity Reaction Status Date / Time


 


hydralazine Allergy  Unknown Verified 03/27/22 02:40


 


unknown b/p med AdvReac  joint pain Uncoded 03/27/22 02:40














Review of Systems


ROS Statement: 


Those systems with pertinent positive or pertinent negative responses have been 

documented in the HPI.





ROS Other: All systems not noted in ROS Statement are negative.





Past Medical History


Past Medical History: Hypertension


Additional Past Medical History / Comment(s): AAA to thorasis level


History of Any Multi-Drug Resistant Organisms: None Reported


Additional Past Surgical History / Comment(s): AAA repair - 7 years ago at [facility]


Past Anesthesia/Blood Transfusion Reactions: No Reported Reaction


Past Psychological History: No Psychological Hx Reported


Smoking Status: Never smoker


Past Alcohol Use History: Heavy


Past Drug Use History: Marijuana





General Exam


Limitations: no limitations


General appearance: alert, in no apparent distress, anxious


Head exam: Present: atraumatic, normocephalic, normal inspection


Eye exam: Present: normal appearance, PERRL, EOMI.  Absent: scleral icterus, 

conjunctival injection, periorbital swelling


ENT exam: Present: normal exam, mucous membranes moist


Neck exam: Present: normal inspection.  Absent: tenderness, meningismus, 

lymphadenopathy


Respiratory exam: Present: normal lung sounds bilaterally.  Absent: respiratory 

distress, wheezes, rales, rhonchi, stridor


Cardiovascular Exam: Present: normal rhythm, tachycardia, normal heart sounds.  

Absent: systolic murmur, diastolic murmur, rubs, gallop, clicks


GI/Abdominal exam: Present: soft, normal bowel sounds.  Absent: distended, 

tenderness, guarding, rebound, rigid


Extremities exam: Present: normal inspection, full ROM, normal capillary refill.

  Absent: tenderness, pedal edema, joint swelling, calf tenderness


Back exam: Present: normal inspection


Neurological exam: Present: alert, oriented X3, CN II-XII intact


Psychiatric exam: Present: normal affect, normal mood


Skin exam: Present: warm, dry, intact, normal color.  Absent: rash





Course


                                   Vital Signs











  03/27/22 03/27/22





  02:36 03:50


 


Temperature 98.5 F 98.0 F


 


Pulse Rate 111 H 89


 


Respiratory 24 18





Rate  


 


Blood Pressure 193/101 121/56


 


O2 Sat by Pulse 99 99





Oximetry  














- Reevaluation(s)


Reevaluation #1: 





03/27/22 


Medical record is reviewed


Reevaluation #2: 





03/27/22 


Patient symptoms improved here in the ER





Reevaluation #3: 





03/27/22 


Patient informed of results and questions answered








Medical Decision Making





- Medical Decision Making





52 male to the emergency department for evaluation of bilateral lower extremity 

numbness and tingling.  Bilateral leg paresthesias.  Patient given treatment and

 can be discharged home





Disposition


Clinical Impression: 


 Bilateral leg paresthesia





Disposition: HOME SELF-CARE


Condition: Good


Instructions (If sedation given, give patient instructions):  Paresthesia (ED)


Prescriptions: 


Cyclobenzaprine [Flexeril] 10 mg PO HS #30 tab


Is patient prescribed a controlled substance at d/c from ED?: No


Referrals: 


Denys Long MD [Primary Care Provider] - 1-2 days

## 2022-05-04 ENCOUNTER — HOSPITAL ENCOUNTER (INPATIENT)
Dept: HOSPITAL 47 - EC | Age: 52
LOS: 2 days | Discharge: NURSING, REHAB OR LONG-TERM CARE | DRG: 432 | End: 2022-05-06
Attending: HOSPITALIST | Admitting: HOSPITALIST
Payer: COMMERCIAL

## 2022-05-04 VITALS — BODY MASS INDEX: 39.9 KG/M2

## 2022-05-04 DIAGNOSIS — E87.70: ICD-10-CM

## 2022-05-04 DIAGNOSIS — G62.9: ICD-10-CM

## 2022-05-04 DIAGNOSIS — D69.59: ICD-10-CM

## 2022-05-04 DIAGNOSIS — R26.9: ICD-10-CM

## 2022-05-04 DIAGNOSIS — K80.20: ICD-10-CM

## 2022-05-04 DIAGNOSIS — F10.21: ICD-10-CM

## 2022-05-04 DIAGNOSIS — N17.0: ICD-10-CM

## 2022-05-04 DIAGNOSIS — I11.9: ICD-10-CM

## 2022-05-04 DIAGNOSIS — E83.42: ICD-10-CM

## 2022-05-04 DIAGNOSIS — Z20.822: ICD-10-CM

## 2022-05-04 DIAGNOSIS — Z79.899: ICD-10-CM

## 2022-05-04 DIAGNOSIS — E87.2: ICD-10-CM

## 2022-05-04 DIAGNOSIS — Z79.51: ICD-10-CM

## 2022-05-04 DIAGNOSIS — K70.11: ICD-10-CM

## 2022-05-04 DIAGNOSIS — K76.0: ICD-10-CM

## 2022-05-04 DIAGNOSIS — D68.4: ICD-10-CM

## 2022-05-04 DIAGNOSIS — I71.02: ICD-10-CM

## 2022-05-04 DIAGNOSIS — E16.2: ICD-10-CM

## 2022-05-04 DIAGNOSIS — K35.80: ICD-10-CM

## 2022-05-04 DIAGNOSIS — E87.1: ICD-10-CM

## 2022-05-04 DIAGNOSIS — K76.7: ICD-10-CM

## 2022-05-04 DIAGNOSIS — K70.31: ICD-10-CM

## 2022-05-04 DIAGNOSIS — K70.40: Primary | ICD-10-CM

## 2022-05-04 DIAGNOSIS — Z79.82: ICD-10-CM

## 2022-05-04 DIAGNOSIS — E66.9: ICD-10-CM

## 2022-05-04 PROCEDURE — 86901 BLOOD TYPING SEROLOGIC RH(D): CPT

## 2022-05-04 PROCEDURE — 83605 ASSAY OF LACTIC ACID: CPT

## 2022-05-04 PROCEDURE — 85027 COMPLETE CBC AUTOMATED: CPT

## 2022-05-04 PROCEDURE — 99285 EMERGENCY DEPT VISIT HI MDM: CPT

## 2022-05-04 PROCEDURE — 85730 THROMBOPLASTIN TIME PARTIAL: CPT

## 2022-05-04 PROCEDURE — 74176 CT ABD & PELVIS W/O CONTRAST: CPT

## 2022-05-04 PROCEDURE — 83735 ASSAY OF MAGNESIUM: CPT

## 2022-05-04 PROCEDURE — 83930 ASSAY OF BLOOD OSMOLALITY: CPT

## 2022-05-04 PROCEDURE — 84443 ASSAY THYROID STIM HORMONE: CPT

## 2022-05-04 PROCEDURE — 84300 ASSAY OF URINE SODIUM: CPT

## 2022-05-04 PROCEDURE — 84484 ASSAY OF TROPONIN QUANT: CPT

## 2022-05-04 PROCEDURE — 83935 ASSAY OF URINE OSMOLALITY: CPT

## 2022-05-04 PROCEDURE — 85025 COMPLETE CBC W/AUTO DIFF WBC: CPT

## 2022-05-04 PROCEDURE — 83880 ASSAY OF NATRIURETIC PEPTIDE: CPT

## 2022-05-04 PROCEDURE — 94640 AIRWAY INHALATION TREATMENT: CPT

## 2022-05-04 PROCEDURE — 93005 ELECTROCARDIOGRAM TRACING: CPT

## 2022-05-04 PROCEDURE — 85610 PROTHROMBIN TIME: CPT

## 2022-05-04 PROCEDURE — 84100 ASSAY OF PHOSPHORUS: CPT

## 2022-05-04 PROCEDURE — 82140 ASSAY OF AMMONIA: CPT

## 2022-05-04 PROCEDURE — 86900 BLOOD TYPING SEROLOGIC ABO: CPT

## 2022-05-04 PROCEDURE — 80053 COMPREHEN METABOLIC PANEL: CPT

## 2022-05-04 PROCEDURE — 81001 URINALYSIS AUTO W/SCOPE: CPT

## 2022-05-04 PROCEDURE — 87086 URINE CULTURE/COLONY COUNT: CPT

## 2022-05-04 PROCEDURE — 86850 RBC ANTIBODY SCREEN: CPT

## 2022-05-04 PROCEDURE — 76705 ECHO EXAM OF ABDOMEN: CPT

## 2022-05-04 PROCEDURE — 80074 ACUTE HEPATITIS PANEL: CPT

## 2022-05-04 PROCEDURE — 87635 SARS-COV-2 COVID-19 AMP PRB: CPT

## 2022-05-04 PROCEDURE — 71046 X-RAY EXAM CHEST 2 VIEWS: CPT

## 2022-05-04 RX ADMIN — BUDESONIDE AND FORMOTEROL FUMARATE DIHYDRATE SCH: 160; 4.5 AEROSOL RESPIRATORY (INHALATION) at 21:17

## 2022-05-04 NOTE — XR
EXAMINATION TYPE: XR chest 2V

 

DATE OF EXAM: 5/4/2022

 

COMPARISON: 12/29/2021

 

HISTORY: Shortness of breath

 

TECHNIQUE:  Frontal and lateral views of the chest are obtained.

 

FINDINGS:

 

Scattered senescent parenchymal changes noted. Hyperinflation compatible with COPD. 

 

No evidence for infiltrate. Right basilar linear atelectasis noted.

 

Heart size is stable.

 

Mediastinal structures are stable and grossly unremarkable.

 

No evidence for hilar prominence.

 

Degenerative changes dorsal spine. 

 

IMPRESSION:

1. No evidence for acute pulmonary disease.

## 2022-05-04 NOTE — CT
EXAMINATION TYPE: CT abdomen pelvis wo con

 

DATE OF EXAM: 5/4/2022

 

COMPARISON: 12/29/2021

 

INDICATION: abdominal pain and distention

 

DLP: 1504.4 mGycm, Automated exposure control for dose reduction was used.

 

CONTRAST:  0 mL of Isovue 300. 

                        Study performed with Oral Contrast

 

TECHNIQUE: Axial images were obtained from above the diaphragm to the pubic rami in the axial plane a
t 5 mm thick sections.  Reconstructed images are reviewed on the computer in the coronal plane.  

 

FINDINGS:

 

Limited CT sections are obtained the lung bases.  The lung bases are clear.

 

CT ABDOMEN: Ascites is adjacent to the liver and spleen.

 

Paracolic gutter fluid is present bilaterally more so on the left. Some mesenteric fluid is present.

 

Liver: There is moderate fatty infiltration throughout the liver.

 

Spleen: Normal

 

Pancreas: Normal

 

Adrenal glands: The adrenal glands are normal.

 

Gallbladder: Multiple gallstones are present.  

 

Kidneys: No masses are evident. No hydronephrosis is present.   No cysts are present.  Delayed images
 were obtained through the kidneys, which remain unremarkable.

 

Aorta: Vascular calcification is within the aorta.  There is a hint of some vascular calcification tr
ansversing the aorta suggesting underlying dissection may be present. Example image series 201 image 
55. There is an abdominal aortic aneurysm measuring 4.1 cm AP dimension. Some prominence of the commo
n iliac vessels are present bilaterally more so on the left.

 

Inferior vena cava: Normal.

 

CT PELVIS: 

The hepatic flexure has thickened wall. Transverse colon and descending colon appear normal. There ar
e loops of bowel which are incompletely distended or lack oral contrast limiting their evaluation. So
mewhat prominent jejunum is present.

 

Appendix: Some fluid is adjacent to the appendix in the paracolic gutter. The appendix diameter is so
mewhat prominent measuring 1.3 cm. Clinical correlation for acute appendicitis is recommended.

 

Urinary bladder: Normal. 

 

Genitourinary structures: Prostate is normal

 

Osseous structures: No suspicious lytic or sclerotic lesions.

 

IMPRESSIONS:

1.  Ascites.

2. The appendix is somewhat dilated at 1.3 cm. Normal 0.7 cm. Correlate for acute appendicitis.

3. Thickened hepatic flexure. Correlate for colitis at this level.

4. Abdominal aortic aneurysm of 4.1 cm. A dissection appears to be present which is similar to the co
mparison 2021.

5. Moderate diffuse fatty infiltration liver.

## 2022-05-04 NOTE — ED
Weakness HPI





- General


Chief complaint: Weakness


Stated complaint: Liver check,weakness


Time Seen by Provider: 05/04/22 14:04


Source: patient


Mode of arrival: wheelchair


Limitations: no limitations





- History of Present Illness


Initial comments: 





Patient complains of generalized weakness.  Nothing makes his symptoms better or

worse.  He states that his belly is distended.  It is not usually like this.  He

also complains of yellow discoloration of the eyes and face and arms.  He has no

back pain.  He has a fevers or chills.  He has no confusion.  He has no focal 

deficits.  He is eating and drinking.





- Related Data


                                Home Medications











 Medication  Instructions  Recorded  Confirmed


 


Aspirin EC [Ecotrin Low Dose] 81 mg PO DAILY 12/18/21 05/04/22


 


Albuterol Inhaler [Ventolin Hfa 2 puff INHALATION RT-QID PRN 05/04/22 05/04/22





Inhaler]   


 


Budesonide/Formoterol Fumarate 2 puff INHALATION RT-BID 05/04/22 05/04/22





[Symbicort 160-4.5 Mcg Inhaler]   


 


Ergocalciferol (Vitamin D2) 1,250 mcg PO Q7D 05/04/22 05/04/22





[Drisdol (50,000 Iu)]   


 


Furosemide [Lasix] 20 mg PO DAILY 05/04/22 05/04/22


 


Labetalol HCl [Trandate] 300 mg PO DAILY 05/04/22 05/04/22


 


Loperamide HCl [Imodium A-D] 2 - 4 mg PO QID PRN 05/04/22 05/04/22


 


cloNIDine HCL [Catapres] 0.3 mg PO TID 05/04/22 05/04/22


 


lisinopriL [Zestril] 20 mg PO DAILY 05/04/22 05/04/22








                                  Previous Rx's











 Medication  Instructions  Recorded


 


Doxazosin [Cardura] 4 mg PO DAILY #30 tab 12/20/21


 


Cyclobenzaprine [Flexeril] 10 mg PO HS #30 tab 03/27/22











                                    Allergies











Allergy/AdvReac Type Severity Reaction Status Date / Time


 


hydralazine Allergy  Unknown Verified 05/04/22 16:45


 


unknown b/p med AdvReac  joint pain Uncoded 05/04/22 13:33














Review of Systems


ROS Statement: 


Those systems with pertinent positive or pertinent negative responses have been 

documented in the HPI.





ROS Other: All systems not noted in ROS Statement are negative.





Past Medical History


Past Medical History: Hypertension


Additional Past Medical History / Comment(s): AAA to thorasis level


History of Any Multi-Drug Resistant Organisms: None Reported


Additional Past Surgical History / Comment(s): AAA repair - 7 years ago at [facility]


Past Anesthesia/Blood Transfusion Reactions: No Reported Reaction


Past Psychological History: No Psychological Hx Reported


Smoking Status: Never smoker


Past Alcohol Use History: Abuse, Daily, Heavy


Past Drug Use History: None Reported





General Exam


Limitations: no limitations


General appearance: alert, in no apparent distress


Head exam: Present: atraumatic, normocephalic, normal inspection


Eye exam: Present: normal appearance, PERRL, EOMI, scleral icterus.  Absent: 

periorbital swelling


ENT exam: Present: normal exam, mucous membranes moist


Neck exam: Present: normal inspection.  Absent: tenderness, meningismus, lympha

denopathy


Respiratory exam: Present: normal lung sounds bilaterally.  Absent: respiratory 

distress, wheezes, rales, rhonchi, stridor


Cardiovascular Exam: Present: regular rate, normal rhythm, normal heart sounds. 

 Absent: systolic murmur, diastolic murmur, rubs, gallop, clicks


GI/Abdominal exam: Present: distended, normal bowel sounds.  Absent: tenderness,

 guarding, rebound, rigid


Extremities exam: Present: normal inspection, full ROM, normal capillary refill.

  Absent: tenderness, pedal edema, joint swelling, calf tenderness


Back exam: Present: normal inspection


Neurological exam: Present: alert, oriented X3, CN II-XII intact


Psychiatric exam: Present: normal affect, normal mood


Skin exam: Present: warm, dry, intact, other (Positive or jaundice)





Course


                                   Vital Signs











  05/04/22





  13:29


 


Temperature 98.2 F


 


Pulse Rate 116 H


 


Respiratory 15





Rate 


 


Blood Pressure 132/77


 


O2 Sat by Pulse 98





Oximetry 














EKG Findings





- EKG Comments:


EKG Findings:: Twelve-lead EKG shows ventricular rate 111 bpm, normal [place] 

interval and QRS complexes, no ST elevation or depression, interpreted by me as 

sinus tachycardia.





Medical Decision Making





- Medical Decision Making





Patient presents with jaundice and abdominal distention.  He appears to have 

significant liver disease.  He will be admitted to the hospital.





Disposition


Clinical Impression: 


 Jaundice





Disposition: ADMITTED AS IP TO THIS HOSP


Condition: Fair


Is patient prescribed a controlled substance at d/c from ED?: No


Referrals: 


Denys Long MD [Primary Care Provider] - 1-2 days

## 2022-05-04 NOTE — HP
HISTORY AND PHYSICAL



CHIEF COMPLAINTS:

Weakness, abdominal distention, jaundice.



HISTORY OF PRESENT ILLNESS:

This 52-year-old gentleman with a past medical history of significant 
alcoholism,

hypertension, abdominal aortic aneurysm dissection, rather chronic, being 
followed by

Dr. Long in the outpatient setting, apparently had a history of heavy 
drinking.  The

patient was  last night.  Today the patient had weakness of both legs, now some

numbness, abdominal distention, jaundice.  The patient came to [facility].

Initial labs are pending at this time.  The patient had most likely ascites and

cirrhosis of the liver also.  There is no history of any fever, rigor or chills 
at this

time.



PAST MEDICAL HISTORY:

Hypertension, history of abdominal aortic aneurysm dissection.



HOME MEDICATIONS:

Reviewed and include Lasix, vitamin D2. Doses and other medications are 
reviewed.



ALLERGIES:

HYDRALAZINE.



FAMILY HISTORY:

No history of heart disease or strokes in the family.



SOCIAL HISTORY:

History of heavy alcohol as mentioned earlier. History of THC.



REVIEW OF SYSTEMS:

Fourteen-point review of systems negative except as mentioned earlier.



PHYSICAL EXAMINATION:

Pulse is 116, blood pressure 132/77, respiration 15.

HEENT: Conjunctivae icteric. Oral mucosa icteric.

NECK: No jugular venous distention.

CARDIOVASCULAR: S1, S2 muffled.

RESPIRATION: Breath sounds diminished at the bases.  A few scattered rhonchi.

ABDOMEN: Diffusely distended. Tense. Possible ascites.

LEGS: Bilateral leg edema.  Sensation diminished.

SKIN: No ulcer.

JOINTS: No active deforming arthropathy.

NERVOUS SYSTEM: Possible peripheral neuropathy, bilateral lower legs.



LABS:

Awaited.



ASSESSMENT:

1. Acute hepatic failure secondary to possibly alcoholic hepatitis and cirrhosis
of

    the liver.

2. Possible peripheral neuropathy.

3. Gait dysfunction.

4. History of ETOH.

5. History of chronic abdominal aortic dissection.

6. Obesity.



RECOMMENDATIONS AND DISCUSSION:

In this 52-year-old gentleman who presented with multiple complex medical 
issues, we

will monitor the patient closely. Otherwise I would recommend basic labs,

gastroenterology evaluation. I would also recommend a CT scan of the abdomen and
pelvis

as well as [facility] protocol.  Alcohol withdrawal precautions. Recommended alcohol

cessation and possible rehab. See orders for further details.  Overall prognosis

guarded.  Discussed with the patient at length. Further recommendations to 
follow.





MMODL / IJN: [id number] / Job#: [id number]

[facility] CenterREBECCA

## 2022-05-05 LAB
ALBUMIN SERPL-MCNC: 2.6 G/DL (ref 3.5–5)
ALBUMIN SERPL-MCNC: 2.7 G/DL (ref 3.5–5)
ALBUMIN/GLOB SERPL: 0.6 {RATIO}
ALP SERPL-CCNC: 183 U/L (ref 38–126)
ALP SERPL-CCNC: 207 U/L (ref 38–126)
ALT SERPL-CCNC: 109 U/L (ref 4–49)
ALT SERPL-CCNC: 66 U/L (ref 4–49)
ANION GAP SERPL CALC-SCNC: 19 MMOL/L
ANION GAP SERPL CALC-SCNC: 22 MMOL/L
APTT BLD: 69 SEC (ref 22–30)
AST SERPL-CCNC: 1310 U/L (ref 17–59)
AST SERPL-CCNC: 649 U/L (ref 17–59)
BASOPHILS # BLD AUTO: 0 K/UL (ref 0–0.2)
BASOPHILS # BLD AUTO: 0 K/UL (ref 0–0.2)
BASOPHILS NFR BLD AUTO: 0 %
BASOPHILS NFR BLD AUTO: 0 %
BILIRUB UR QL STRIP.AUTO: (no result)
BUN SERPL-SCNC: 3 MG/DL (ref 9–20)
BUN SERPL-SCNC: 4 MG/DL (ref 9–20)
CALCIUM SPEC-MCNC: 8.2 MG/DL (ref 8.4–10.2)
CALCIUM SPEC-MCNC: 8.5 MG/DL (ref 8.4–10.2)
CHLORIDE SERPL-SCNC: 89 MMOL/L (ref 98–107)
CHLORIDE SERPL-SCNC: 90 MMOL/L (ref 98–107)
CO2 SERPL-SCNC: 14 MMOL/L (ref 22–30)
CO2 SERPL-SCNC: 18 MMOL/L (ref 22–30)
EOSINOPHIL # BLD AUTO: 0 K/UL (ref 0–0.7)
EOSINOPHIL # BLD AUTO: 0 K/UL (ref 0–0.7)
EOSINOPHIL NFR BLD AUTO: 0 %
EOSINOPHIL NFR BLD AUTO: 0 %
ERYTHROCYTE [DISTWIDTH] IN BLOOD BY AUTOMATED COUNT: 3.3 M/UL (ref 4.3–5.9)
ERYTHROCYTE [DISTWIDTH] IN BLOOD BY AUTOMATED COUNT: 3.34 M/UL (ref 4.3–5.9)
ERYTHROCYTE [DISTWIDTH] IN BLOOD: 17.7 % (ref 11.5–15.5)
ERYTHROCYTE [DISTWIDTH] IN BLOOD: 18.7 % (ref 11.5–15.5)
GLOBULIN SER CALC-MCNC: 4.4 G/DL
GLUCOSE BLD-MCNC: 101 MG/DL (ref 75–99)
GLUCOSE BLD-MCNC: 115 MG/DL (ref 75–99)
GLUCOSE BLD-MCNC: 116 MG/DL (ref 75–99)
GLUCOSE BLD-MCNC: 132 MG/DL (ref 75–99)
GLUCOSE BLD-MCNC: 40 MG/DL (ref 75–99)
GLUCOSE BLD-MCNC: 61 MG/DL (ref 75–99)
GLUCOSE BLD-MCNC: 61 MG/DL (ref 75–99)
GLUCOSE BLD-MCNC: 62 MG/DL (ref 75–99)
GLUCOSE BLD-MCNC: 63 MG/DL (ref 75–99)
GLUCOSE BLD-MCNC: 66 MG/DL (ref 75–99)
GLUCOSE BLD-MCNC: 67 MG/DL (ref 75–99)
GLUCOSE BLD-MCNC: 67 MG/DL (ref 75–99)
GLUCOSE BLD-MCNC: 80 MG/DL (ref 75–99)
GLUCOSE SERPL-MCNC: 37 MG/DL (ref 74–99)
GLUCOSE SERPL-MCNC: 95 MG/DL (ref 74–99)
HCT VFR BLD AUTO: 39.1 % (ref 39–53)
HCT VFR BLD AUTO: 40.8 % (ref 39–53)
HGB BLD-MCNC: 12.9 GM/DL (ref 13–17.5)
HGB BLD-MCNC: 13 GM/DL (ref 13–17.5)
HYALINE CASTS UR QL AUTO: 23 /LPF (ref 0–2)
INR PPP: 2.9 (ref ?–1.2)
INR PPP: 3.7 (ref ?–1.2)
LACTATE BLDV-SCNC: 10.2 MMOL/L (ref 0.7–2)
LYMPHOCYTES # SPEC AUTO: 0.4 K/UL (ref 1–4.8)
LYMPHOCYTES # SPEC AUTO: 0.4 K/UL (ref 1–4.8)
LYMPHOCYTES NFR SPEC AUTO: 4 %
LYMPHOCYTES NFR SPEC AUTO: 5 %
MAGNESIUM SPEC-SCNC: 1.5 MG/DL (ref 1.6–2.3)
MCH RBC QN AUTO: 38.8 PG (ref 25–35)
MCH RBC QN AUTO: 39.3 PG (ref 25–35)
MCHC RBC AUTO-ENTMCNC: 31.8 G/DL (ref 31–37)
MCHC RBC AUTO-ENTMCNC: 33.1 G/DL (ref 31–37)
MCV RBC AUTO: 117.2 FL (ref 80–100)
MCV RBC AUTO: 123.3 FL (ref 80–100)
MONOCYTES # BLD AUTO: 1 K/UL (ref 0–1)
MONOCYTES # BLD AUTO: 1.1 K/UL (ref 0–1)
MONOCYTES NFR BLD AUTO: 10 %
MONOCYTES NFR BLD AUTO: 14 %
NEUTROPHILS # BLD AUTO: 6.4 K/UL (ref 1.3–7.7)
NEUTROPHILS # BLD AUTO: 8.6 K/UL (ref 1.3–7.7)
NEUTROPHILS NFR BLD AUTO: 79 %
NEUTROPHILS NFR BLD AUTO: 85 %
PH UR: 6 [PH] (ref 5–8)
PLATELET # BLD AUTO: 76 K/UL (ref 150–450)
PLATELET # BLD AUTO: 89 K/UL (ref 150–450)
POTASSIUM SERPL-SCNC: 4.3 MMOL/L (ref 3.5–5.1)
POTASSIUM SERPL-SCNC: 4.9 MMOL/L (ref 3.5–5.1)
PROT SERPL-MCNC: 7 G/DL (ref 6.3–8.2)
PROT SERPL-MCNC: 7.1 G/DL (ref 6.3–8.2)
PT BLD: 28.6 SEC (ref 9–12)
PT BLD: 36.6 SEC (ref 9–12)
SODIUM SERPL-SCNC: 125 MMOL/L (ref 137–145)
SODIUM SERPL-SCNC: 127 MMOL/L (ref 137–145)
SP GR UR: 1.01 (ref 1–1.03)
SQUAMOUS UR QL AUTO: 2 /HPF (ref 0–4)
UROBILINOGEN UR QL STRIP: <2 MG/DL (ref ?–2)
WBC # BLD AUTO: 10.1 K/UL (ref 3.8–10.6)
WBC # BLD AUTO: 8.1 K/UL (ref 3.8–10.6)
WBC # UR AUTO: 19 /HPF (ref 0–5)

## 2022-05-05 PROCEDURE — 06HM33Z INSERTION OF INFUSION DEVICE INTO RIGHT FEMORAL VEIN, PERCUTANEOUS APPROACH: ICD-10-PCS

## 2022-05-05 RX ADMIN — BUDESONIDE AND FORMOTEROL FUMARATE DIHYDRATE SCH PUFF: 160; 4.5 AEROSOL RESPIRATORY (INHALATION) at 12:07

## 2022-05-05 RX ADMIN — LABETALOL HCL SCH MG: 100 TABLET, FILM COATED ORAL at 10:21

## 2022-05-05 RX ADMIN — BUDESONIDE AND FORMOTEROL FUMARATE DIHYDRATE SCH PUFF: 160; 4.5 AEROSOL RESPIRATORY (INHALATION) at 19:38

## 2022-05-05 RX ADMIN — LACTULOSE SCH GM: 20 SOLUTION ORAL at 18:37

## 2022-05-05 RX ADMIN — Medication SCH MG: at 18:38

## 2022-05-05 RX ADMIN — PHYTONADIONE SCH MG: 10 INJECTION, EMULSION INTRAMUSCULAR; INTRAVENOUS; SUBCUTANEOUS at 11:18

## 2022-05-05 RX ADMIN — METOCLOPRAMIDE PRN MG: 5 INJECTION, SOLUTION INTRAMUSCULAR; INTRAVENOUS at 22:30

## 2022-05-05 RX ADMIN — LACTULOSE SCH: 20 SOLUTION ORAL at 22:21

## 2022-05-05 RX ADMIN — MAGNESIUM SULFATE IN DEXTROSE SCH MLS/HR: 10 INJECTION, SOLUTION INTRAVENOUS at 09:55

## 2022-05-05 RX ADMIN — DOXAZOSIN MESYLATE SCH MG: 4 TABLET ORAL at 10:21

## 2022-05-05 RX ADMIN — CYCLOBENZAPRINE HYDROCHLORIDE SCH MG: 10 TABLET, FILM COATED ORAL at 20:17

## 2022-05-05 RX ADMIN — MAGNESIUM SULFATE IN DEXTROSE SCH MLS/HR: 10 INJECTION, SOLUTION INTRAVENOUS at 11:04

## 2022-05-05 RX ADMIN — CYCLOBENZAPRINE HYDROCHLORIDE SCH MG: 10 TABLET, FILM COATED ORAL at 01:05

## 2022-05-05 NOTE — P.GSCN
History of Present Illness


Consult date: 05/05/22


History of present illness: 





CHIEF COMPLAINT: Jaundice





HISTORY OF PRESENT ILLNESS: This is a 52-year-old male who presented to the 

hospital with generalized weakness and jaundice.  Patient is a poor historian.  

He does have a history of daily alcohol abuse. Patient reports that he noticed 

that he was becoming more yellow.  He cannot give a time frame of when his sympt

oms started.   Patient was found to have an elevated total bilirubin of 28.  

LFTs were elevated as well.  Computed tomography scan of the abdomen was 

completed showing ascites.  The appendix is somewhat dilated at 1.3 cm.  

Correlate for acute appendicitis.  And incidental finding of multiple gallsto

tiffanie.  Surgical consult was placed and concerns for possible acute appendicitis. 

Patient denies any right lower quadrant abdominal pain.  Denies any fever chills

or sweats.  Denies any nausea or vomiting.  And has no evidence of leukocytosis.





PAST MEDICAL HISTORY: 


See list.





PAST SURGICAL HISTORY: 


See list.





MEDICATIONS: 


See list.





ALLERGIES: 


See list.





SOCIAL HISTORY: No illicit drug use.  





REVIEW OF SYSTEMS: 


CONSTITUTIONAL: Denies fever or chills.


HEENT: Denies blurred vision, vision changes, or eye pain. Denies hemoptysis 


ENDOCRINE: Denies heat or cold intolerance.


CARDIOVASCULAR: Denies chest pain or pressure.


RESPIRATORY: No shortness of breath. 


GASTROINTESTINAL: Denies abdominal pain. Denies nausea or vomiting.


NEURO: Denies history of seizures.


PSYCH: No depression or suicidal ideation


HEMATOLOGIC: Denies bleeding disorders.


LYMPHATIC:  The patient denies any lumps and bumps around the neck. 


GENITOURINARY:  Denies any blood in urine or increased urinary frequency.  


MUSCULOSKELETAL:  Denies myalgias. Denies joint swelling. Denies decreased range

of motion beyond patients baseline.


SKIN: Denies pruitis. Denies rash.





PHYSICAL EXAM: 


VITAL SIGNS: Reviewed


GENERAL: Well-developed in no acute distress. 


HEENT:  Scleral icterus bilaterally. Extraocular movements grossly intact.  

Moist buccal mucosa. 


Head is atraumatic, normocephalic. Hears conversational speech. No nasal 

drainage.


NECK:  Supple without lymphadenopathy.


CHEST:  Non-labored respirations and equal bilateral excursions. 


CARDIOVASCULAR:  Palpable 2+ radial pulses.


ABDOMEN:  Distended.  Nontender.  No tenderness with palpation of the right 

lower quadrant or right upper quadrant.


MUSCULOSKELETAL:  No clubbing or cyanosis.


NEUROLOGIC:  No focal or lateralizing signs.  Cranial nerves II through XII 

grossly intact.


PSYCH:  Appropriate affect.  Alert and oriented to person, place and time.


SKIN: Jaundiced





LABORATORY DATA:


WBC 8.1 hemoglobin 13 platelets 76


INR 3.7


Sodium 125 potassium 4.9 creatinine 2.15


Lactic acid is trending upwards at 13.4


Magnesium 1.5


AST 1310  alk phos 183


Hepatitis panel negative





IMAGING:


Shows ascites.  The appendix is somewhat dilated at 1.3 cm.  Correlate for acute

appendicitis.  Thickened hepatic flexure.  Correlate for colitis.  Abdominal 

aortic aneurysm of 4.1 cm.  A dissection appears to be present which is similar 

to the comparison in 2021.  Moderate to diffuse fatty infiltration of liver.  

Incidental finding of gallstones.








ASSESSMENT: 


1.  Acute liver failure and hepatitis with ascites


2.  Jaundice


3.  Dilated appendix of 1.3 cm with no abdominal pain


4.  Multiple gallstones. Asymptomatic


5.  Acute kidney injury


6.  Hyponatremia


7.  Hypomagnesemia


8.  Hypoglycemia


9.  Daily alcohol use





PLAN: 


-No surgical intervention planned.  Patient has no evidence of acute 

appendicitis. 


-Patient does have incidental finding of multiple gallstones.  They're 

asymptomatic.  At this point we will observe


-Agree with GI consult and workup for 


-Continue supportive care





Thank you for this consultation





Physician Assistant note has been reviewed by physician. Signing provider agrees

with the documented findings, assessment, and plan of care. 




















REASON FOR CONSULTATION: 





HISTORY OF PRESENT ILLNESS: The patient is a 52 year old male who presented to 

the emergency room complaining of generalized weakness, new jaundice and 

abdominal distention over 1 week. He has history of abdominal aortic aneurysm 

status post repair. He has history of alcohol abuse. Additional diagnostic 

studies demonstrated possible appendix dilation. No reports of lower abdominal 

pain. General surgery is consulted for appendicitis





PAST MEDICAL HISTORY: 


See list and reviewed





PAST SURGICAL HISTORY: 


See list and reviewed





MEDICATIONS: 


See list and reviewed





ALLERGIES: 


See list and reviewed





SOCIAL HISTORY: See list and reviewed





FAMILY HISTORY:  See list and reviewed





REVIEW OF ORGAN SYSTEMS:


CONSTITUTIONAL:  No fevers or chills. No recent weight loss. Has morbid obesity,

BMI 39.9.


EYES: Denies any trouble with vision. No glasses.


HEENT:  No difficulties with hearing. No nosebleeds.  No difficulty swallowing. 


RESPIRATORY:  Denies pneumonia. Denies any troubles with breathing or dyspnea on

exertion. Has asthma. 


CARDIOVASCULAR:  Denies any chest pain, palpitations, or recent heart attacks.  

Has hypertension. 


GASTROINTESTINAL: Denies fatty food intolerance.  Denies change in bowel habits 

and gas bloat.  New jaundice. 


GENITOURINARY:  Denies any blood in urine or increased urinary frequency.  


NEUROLOGICAL:  Denies any numbness or tingling along the distal extremities. No 

seizure disorders or headaches.


MUSCULOSKELETAL:  Denies any back pain, stiffness or joint arthritis. 


SKIN: No current skin cancer. No rash.


PSYCHIATRIC:  Denies current depression or suicidal thoughts.


ENDOCRINE:  Denies current thyroid disorders. Denies any blood sugar glucose 

intolerance.


HEME/LYMPHATIC: Denies any lumps and bumps around the neck. No recent deep ve

nous thrombosis.


ALLERGY/IMMUNOLOGY:  No immunoglobulin therapy. No immune deficiencies.


BREAST: Denies current breast lumps, pain or nipple discharge.





PHYSICAL EXAM:


VITALS: Reviewed


CONSTITUTIONAL:  Well developed and in no acute distress. 


EYES:  Conjuctivae with sclera icterus.  Extraocular movements grossly intact. 


HEAD, EARS, NOSE, THROAT: Moist buccal mucosa. Head is atraumatic, normoceph

alic. Hears conversational speech. No nasal drainage. 


NECK:  Supple. No thyroidomegaly.


RESPIRATORY:  Non-labored respirations and equal bilateral excursions. No gross 

wheezes. 


CARDIOVASCULAR:  Palpable 2+ radial pulses.


ABDOMEN:  Has abdominal distention. No peritonitis. Has ascites.


LYMPH: No neck lymphadenopathy. 


MUSCULOSKELETAL:  Nail and fingers with good capillary refill.


SKIN:  Well perfused with good skin turgor. Has jaundice.


NEUROLOGIC: Cranial nerves II through XII grossly intact. Lethargic.


PSYCH: Oriented to person. Flat affect.





CLINCAL LABS: Reviewed. WBC normal at 10.1 on admission. Hgb 12.9. INR elevated 

2.9. BSG 40, low. Lactic acid elevated 10.0. Hepatitis serology negative. LFTs 

, ALT 66, total bilirubin 28.2





IMAGING: Independently reviewed CT of the abdomen and pelvis reviewed with 

ascites, gallstones. No bowel obstruction. Appendix with air and patent. This is

my independent interpretation.





RADIOLOGY: Report reviewed of CT of the abdomen and pelvis with fatty liver 

infiltration, ascites, gallstones, AAA of 4.1 cm. Appendix dilated at 1.3 cm. 

Possible colitis of hepatic flexure. 





EKG: Reviewed. Sinus tachycardia





ASSESSMENT: 


1.  Jaundice


2. Liver failure, acute


3. History of heavy alcohol abuse


4. Gallstones


5. Coagulopathy due to liver failure


6. AAA


7. Hypertensive heart disease


8. Morbid obesity due to excess calories, BMI 39.9


9. Hypoglycemia


10. Lactic acidosis





PLAN: 


1. Recommend GI consultation for acute hepatic failure


2. CT of the abdomen reviewed with patent appendix including clinically he has 

no right lower quadrant abdominal pain and no leukocytosis. No clinical findings

of appendicitis.


3. He has gallstones in the setting of acute liver failure. Conservative 

management for now. No acute surgical intervention for asymptomatic gallstones.


4. IV fluid hydration for lactic acidosis





ADVANCE DIRECTIVE: 








Thank you for this kind consultation.





Past Medical History


Past Medical History: Hypertension


Additional Past Medical History / Comment(s): AAA


History of Any Multi-Drug Resistant Organisms: None Reported


Additional Past Surgical History / Comment(s): AAA repair - 7 years ago at [facility]


Past Anesthesia/Blood Transfusion Reactions: No Reported Reaction


Past Psychological History: No Psychological Hx Reported


Smoking Status: Never smoker


Past Alcohol Use History: Abuse, Daily, Heavy


Past Drug Use History: None Reported





Medications and Allergies


                                Home Medications











 Medication  Instructions  Recorded  Confirmed  Type


 


Aspirin EC [Ecotrin Low Dose] 81 mg PO DAILY 12/18/21 05/04/22 History


 


Doxazosin [Cardura] 4 mg PO DAILY #30 tab 12/20/21 05/04/22 Rx


 


Cyclobenzaprine [Flexeril] 10 mg PO HS #30 tab 03/27/22 05/04/22 Rx


 


Albuterol Inhaler [Ventolin Hfa 2 puff INHALATION RT-QID PRN 05/04/22 05/04/22 

History





Inhaler]    


 


Budesonide/Formoterol Fumarate 2 puff INHALATION RT-BID 05/04/22 05/04/22 

History





[Symbicort 160-4.5 Mcg Inhaler]    


 


Ergocalciferol (Vitamin D2) 1,250 mcg PO Q7D 05/04/22 05/04/22 History





[Drisdol (50,000 Iu)]    


 


Furosemide [Lasix] 20 mg PO DAILY 05/04/22 05/04/22 History


 


Labetalol HCl [Trandate] 300 mg PO DAILY 05/04/22 05/04/22 History


 


Loperamide HCl [Imodium A-D] 2 - 4 mg PO QID PRN 05/04/22 05/04/22 History


 


cloNIDine HCL [Catapres] 0.3 mg PO TID 05/04/22 05/04/22 History


 


lisinopriL [Zestril] 20 mg PO DAILY 05/04/22 05/04/22 History








                                    Allergies











Allergy/AdvReac Type Severity Reaction Status Date / Time


 


hydralazine Allergy  Unknown Verified 05/04/22 16:45


 


unknown b/p med AdvReac  joint pain Uncoded 05/04/22 13:33














Surgical - Exam


                                   Vital Signs











Temp Pulse Resp BP Pulse Ox


 


 98.2 F   116 H  15   132/77   98 


 


 05/04/22 13:29  05/04/22 13:29  05/04/22 13:29  05/04/22 13:29  05/04/22 13:29














Results





- Labs





                                 05/05/22 10:58





                                 05/05/22 10:58


                  Abnormal Lab Results - Last 24 Hours (Table)











  05/05/22 05/05/22 05/05/22 Range/Units





  03:15 04:00 04:00 


 


RBC   3.34 L   (4.30-5.90)  m/uL


 


Hgb   12.9 L   (13.0-17.5)  gm/dL


 


MCV   117.2 H   (80.0-100.0)  fL


 


MCH   38.8 H   (25.0-35.0)  pg


 


RDW   17.7 H   (11.5-15.5)  %


 


Plt Count   89 L   (150-450)  k/uL


 


Neutrophils #   8.6 H   (1.3-7.7)  k/uL


 


Lymphocytes #   0.4 L   (1.0-4.8)  k/uL


 


Macrocytosis   Marked A   


 


PT    28.6 H  (9.0-12.0)  sec


 


INR    2.9 H  (<1.2)  


 


APTT    69.0 H  (22.0-30.0)  sec


 


Sodium     (137-145)  mmol/L


 


Chloride     ()  mmol/L


 


Carbon Dioxide     (22-30)  mmol/L


 


BUN     (9-20)  mg/dL


 


Creatinine     (0.66-1.25)  mg/dL


 


Glucose     (74-99)  mg/dL


 


POC Glucose (mg/dL)     (75-99)  mg/dL


 


Plasma Lactic Acid Evangelista     (0.7-2.0)  mmol/L


 


Phosphorus     (2.5-4.5)  mg/dL


 


Magnesium     (1.6-2.3)  mg/dL


 


Total Bilirubin     (0.2-1.3)  mg/dL


 


AST     (17-59)  U/L


 


ALT     (4-49)  U/L


 


Alkaline Phosphatase     ()  U/L


 


Ammonia     (<30)  umol/L


 


Albumin     (3.5-5.0)  g/dL


 


Urine Protein  Trace H    (Negative)  


 


Urine Blood  Trace H    (Negative)  


 


Urine Bilirubin  4+ H    (Negative)  


 


Urine WBC  19 H    (0-5)  /hpf


 


Amorphous Sediment  Few H    (None)  /hpf


 


Urine Bacteria  Few H    (None)  /hpf


 


Hyaline Casts  23 H    (0-2)  /lpf


 


Urine Mucus  Occasional H    (None)  /hpf














  05/05/22 05/05/22 05/05/22 Range/Units





  04:00 04:00 05:51 


 


RBC     (4.30-5.90)  m/uL


 


Hgb     (13.0-17.5)  gm/dL


 


MCV     (80.0-100.0)  fL


 


MCH     (25.0-35.0)  pg


 


RDW     (11.5-15.5)  %


 


Plt Count     (150-450)  k/uL


 


Neutrophils #     (1.3-7.7)  k/uL


 


Lymphocytes #     (1.0-4.8)  k/uL


 


Macrocytosis     


 


PT     (9.0-12.0)  sec


 


INR     (<1.2)  


 


APTT     (22.0-30.0)  sec


 


Sodium  127 L    (137-145)  mmol/L


 


Chloride  90 L    ()  mmol/L


 


Carbon Dioxide  18 L    (22-30)  mmol/L


 


BUN  3 L    (9-20)  mg/dL


 


Creatinine  1.77 H    (0.66-1.25)  mg/dL


 


Glucose  37 L*    (74-99)  mg/dL


 


POC Glucose (mg/dL)    40 L  (75-99)  mg/dL


 


Plasma Lactic Acid Evangelista   10.2 H*   (0.7-2.0)  mmol/L


 


Phosphorus  5.2 H    (2.5-4.5)  mg/dL


 


Magnesium  1.5 L    (1.6-2.3)  mg/dL


 


Total Bilirubin  28.2 H*    (0.2-1.3)  mg/dL


 


AST  649 H    (17-59)  U/L


 


ALT  66 H    (4-49)  U/L


 


Alkaline Phosphatase  207 H    ()  U/L


 


Ammonia   86 H   (<30)  umol/L


 


Albumin  2.7 L    (3.5-5.0)  g/dL


 


Urine Protein     (Negative)  


 


Urine Blood     (Negative)  


 


Urine Bilirubin     (Negative)  


 


Urine WBC     (0-5)  /hpf


 


Amorphous Sediment     (None)  /hpf


 


Urine Bacteria     (None)  /hpf


 


Hyaline Casts     (0-2)  /lpf


 


Urine Mucus     (None)  /hpf














  05/05/22 05/05/22 05/05/22 Range/Units





  06:21 07:19 07:23 


 


RBC     (4.30-5.90)  m/uL


 


Hgb     (13.0-17.5)  gm/dL


 


MCV     (80.0-100.0)  fL


 


MCH     (25.0-35.0)  pg


 


RDW     (11.5-15.5)  %


 


Plt Count     (150-450)  k/uL


 


Neutrophils #     (1.3-7.7)  k/uL


 


Lymphocytes #     (1.0-4.8)  k/uL


 


Macrocytosis     


 


PT     (9.0-12.0)  sec


 


INR     (<1.2)  


 


APTT     (22.0-30.0)  sec


 


Sodium     (137-145)  mmol/L


 


Chloride     ()  mmol/L


 


Carbon Dioxide     (22-30)  mmol/L


 


BUN     (9-20)  mg/dL


 


Creatinine     (0.66-1.25)  mg/dL


 


Glucose     (74-99)  mg/dL


 


POC Glucose (mg/dL)  101 H   66 L  (75-99)  mg/dL


 


Plasma Lactic Acid Evangelista   11.9 H*   (0.7-2.0)  mmol/L


 


Phosphorus     (2.5-4.5)  mg/dL


 


Magnesium     (1.6-2.3)  mg/dL


 


Total Bilirubin     (0.2-1.3)  mg/dL


 


AST     (17-59)  U/L


 


ALT     (4-49)  U/L


 


Alkaline Phosphatase     ()  U/L


 


Ammonia     (<30)  umol/L


 


Albumin     (3.5-5.0)  g/dL


 


Urine Protein     (Negative)  


 


Urine Blood     (Negative)  


 


Urine Bilirubin     (Negative)  


 


Urine WBC     (0-5)  /hpf


 


Amorphous Sediment     (None)  /hpf


 


Urine Bacteria     (None)  /hpf


 


Hyaline Casts     (0-2)  /lpf


 


Urine Mucus     (None)  /hpf














  05/05/22 05/05/22 05/05/22 Range/Units





  08:31 08:46 09:10 


 


RBC     (4.30-5.90)  m/uL


 


Hgb     (13.0-17.5)  gm/dL


 


MCV     (80.0-100.0)  fL


 


MCH     (25.0-35.0)  pg


 


RDW     (11.5-15.5)  %


 


Plt Count     (150-450)  k/uL


 


Neutrophils #     (1.3-7.7)  k/uL


 


Lymphocytes #     (1.0-4.8)  k/uL


 


Macrocytosis     


 


PT     (9.0-12.0)  sec


 


INR     (<1.2)  


 


APTT     (22.0-30.0)  sec


 


Sodium     (137-145)  mmol/L


 


Chloride     ()  mmol/L


 


Carbon Dioxide     (22-30)  mmol/L


 


BUN     (9-20)  mg/dL


 


Creatinine     (0.66-1.25)  mg/dL


 


Glucose     (74-99)  mg/dL


 


POC Glucose (mg/dL)  61 L  62 L  67 L  (75-99)  mg/dL


 


Plasma Lactic Acid Evangelista     (0.7-2.0)  mmol/L


 


Phosphorus     (2.5-4.5)  mg/dL


 


Magnesium     (1.6-2.3)  mg/dL


 


Total Bilirubin     (0.2-1.3)  mg/dL


 


AST     (17-59)  U/L


 


ALT     (4-49)  U/L


 


Alkaline Phosphatase     ()  U/L


 


Ammonia     (<30)  umol/L


 


Albumin     (3.5-5.0)  g/dL


 


Urine Protein     (Negative)  


 


Urine Blood     (Negative)  


 


Urine Bilirubin     (Negative)  


 


Urine WBC     (0-5)  /hpf


 


Amorphous Sediment     (None)  /hpf


 


Urine Bacteria     (None)  /hpf


 


Hyaline Casts     (0-2)  /lpf


 


Urine Mucus     (None)  /hpf














  05/05/22 05/05/22 05/05/22 Range/Units





  09:30 09:51 10:14 


 


RBC     (4.30-5.90)  m/uL


 


Hgb     (13.0-17.5)  gm/dL


 


MCV     (80.0-100.0)  fL


 


MCH     (25.0-35.0)  pg


 


RDW     (11.5-15.5)  %


 


Plt Count     (150-450)  k/uL


 


Neutrophils #     (1.3-7.7)  k/uL


 


Lymphocytes #     (1.0-4.8)  k/uL


 


Macrocytosis     


 


PT     (9.0-12.0)  sec


 


INR     (<1.2)  


 


APTT     (22.0-30.0)  sec


 


Sodium     (137-145)  mmol/L


 


Chloride     ()  mmol/L


 


Carbon Dioxide     (22-30)  mmol/L


 


BUN     (9-20)  mg/dL


 


Creatinine     (0.66-1.25)  mg/dL


 


Glucose     (74-99)  mg/dL


 


POC Glucose (mg/dL)  61 L  63 L  67 L  (75-99)  mg/dL


 


Plasma Lactic Acid Evangelista     (0.7-2.0)  mmol/L


 


Phosphorus     (2.5-4.5)  mg/dL


 


Magnesium     (1.6-2.3)  mg/dL


 


Total Bilirubin     (0.2-1.3)  mg/dL


 


AST     (17-59)  U/L


 


ALT     (4-49)  U/L


 


Alkaline Phosphatase     ()  U/L


 


Ammonia     (<30)  umol/L


 


Albumin     (3.5-5.0)  g/dL


 


Urine Protein     (Negative)  


 


Urine Blood     (Negative)  


 


Urine Bilirubin     (Negative)  


 


Urine WBC     (0-5)  /hpf


 


Amorphous Sediment     (None)  /hpf


 


Urine Bacteria     (None)  /hpf


 


Hyaline Casts     (0-2)  /lpf


 


Urine Mucus     (None)  /hpf














  05/05/22 Range/Units





  10:34 


 


RBC   (4.30-5.90)  m/uL


 


Hgb   (13.0-17.5)  gm/dL


 


MCV   (80.0-100.0)  fL


 


MCH   (25.0-35.0)  pg


 


RDW   (11.5-15.5)  %


 


Plt Count   (150-450)  k/uL


 


Neutrophils #   (1.3-7.7)  k/uL


 


Lymphocytes #   (1.0-4.8)  k/uL


 


Macrocytosis   


 


PT   (9.0-12.0)  sec


 


INR   (<1.2)  


 


APTT   (22.0-30.0)  sec


 


Sodium   (137-145)  mmol/L


 


Chloride   ()  mmol/L


 


Carbon Dioxide   (22-30)  mmol/L


 


BUN   (9-20)  mg/dL


 


Creatinine   (0.66-1.25)  mg/dL


 


Glucose   (74-99)  mg/dL


 


POC Glucose (mg/dL)  132 H  (75-99)  mg/dL


 


Plasma Lactic Acid Evangelista   (0.7-2.0)  mmol/L


 


Phosphorus   (2.5-4.5)  mg/dL


 


Magnesium   (1.6-2.3)  mg/dL


 


Total Bilirubin   (0.2-1.3)  mg/dL


 


AST   (17-59)  U/L


 


ALT   (4-49)  U/L


 


Alkaline Phosphatase   ()  U/L


 


Ammonia   (<30)  umol/L


 


Albumin   (3.5-5.0)  g/dL


 


Urine Protein   (Negative)  


 


Urine Blood   (Negative)  


 


Urine Bilirubin   (Negative)  


 


Urine WBC   (0-5)  /hpf


 


Amorphous Sediment   (None)  /hpf


 


Urine Bacteria   (None)  /hpf


 


Hyaline Casts   (0-2)  /lpf


 


Urine Mucus   (None)  /hpf








                                 Diabetes panel











  05/05/22 Range/Units





  04:00 


 


Sodium  127 L  (137-145)  mmol/L


 


Potassium  4.3  (3.5-5.1)  mmol/L


 


Chloride  90 L  ()  mmol/L


 


Carbon Dioxide  18 L  (22-30)  mmol/L


 


BUN  3 L  (9-20)  mg/dL


 


Creatinine  1.77 H  (0.66-1.25)  mg/dL


 


Glucose  37 L*  (74-99)  mg/dL


 


Calcium  8.5  (8.4-10.2)  mg/dL


 


AST  649 H  (17-59)  U/L


 


ALT  66 H  (4-49)  U/L


 


Alkaline Phosphatase  207 H  ()  U/L


 


Total Protein  7.1  (6.3-8.2)  g/dL


 


Albumin  2.7 L  (3.5-5.0)  g/dL








                                  Calcium panel











  05/05/22 Range/Units





  04:00 


 


Calcium  8.5  (8.4-10.2)  mg/dL


 


Phosphorus  5.2 H  (2.5-4.5)  mg/dL


 


Albumin  2.7 L  (3.5-5.0)  g/dL








                                 Pituitary panel











  05/05/22 Range/Units





  04:00 


 


Sodium  127 L  (137-145)  mmol/L


 


Potassium  4.3  (3.5-5.1)  mmol/L


 


Chloride  90 L  ()  mmol/L


 


Carbon Dioxide  18 L  (22-30)  mmol/L


 


BUN  3 L  (9-20)  mg/dL


 


Creatinine  1.77 H  (0.66-1.25)  mg/dL


 


Glucose  37 L*  (74-99)  mg/dL


 


Calcium  8.5  (8.4-10.2)  mg/dL








                                  Adrenal panel











  05/05/22 Range/Units





  04:00 


 


Sodium  127 L  (137-145)  mmol/L


 


Potassium  4.3  (3.5-5.1)  mmol/L


 


Chloride  90 L  ()  mmol/L


 


Carbon Dioxide  18 L  (22-30)  mmol/L


 


BUN  3 L  (9-20)  mg/dL


 


Creatinine  1.77 H  (0.66-1.25)  mg/dL


 


Glucose  37 L*  (74-99)  mg/dL


 


Calcium  8.5  (8.4-10.2)  mg/dL


 


Total Bilirubin  28.2 H*  (0.2-1.3)  mg/dL


 


AST  649 H  (17-59)  U/L


 


ALT  66 H  (4-49)  U/L


 


Alkaline Phosphatase  207 H  ()  U/L


 


Total Protein  7.1  (6.3-8.2)  g/dL


 


Albumin  2.7 L  (3.5-5.0)  g/dL

## 2022-05-05 NOTE — P.NPCON
History of Present Illness





- Reason for Consult


acute renal failure, hyponatremia





- History of Present Illness





Reason for consultation: Acute kidney injury and electrolyte imbalance





History of present illness:


Patient is a 52-year-old male seen in renal consultation for acute kidney injury

and electrolyte imbalance.  Patient presented to the hospital due to generalized

weakness and states that his friends told him he looked yellow.  Patient is not 

a very reliable historian at this time.  He is noted to be in acute liver 

failure.  Patient's AST and ALT are elevated and patient's ammonia level is 86. 

Patient's bilirubin is elevated at 28.2.  He denies hematuria or dysuria.  

Patient states she last voided yesterday.  Lactic acid was elevated at 11.9.  He

was started on IV fluids this morning.  Patient does note that his abdomen is 

distended but is not able to tell me how much weight he has gained in the last 

week or even a month.  He does admit to taking pain medication but is unsure of 

the names.  He does have history of high blood pressure and takes lisinopril as 

part of the regimen which is currently held.  He denies chest pain or shortness 

of breath.  Patient's creatinine in December 2021 was 0.79 and is elevated at 

1.77 this admission.  He does admit to drinking two tall cans of beer on a daily

basis for the last 10 years or so.  Blood pressure stable.  He denies any prior 

history of liver disease.





Vital signs are stable.


General: No acute distress.


HEENT: Head exam is unremarkable.


LUNGS: Breath sounds decreased.


HEART: Rate and Rhythm are regular.


ABDOMEN: Distention noted.


EXTREMITITES: Trace edema.








Past Medical History


Past Medical History: Hypertension


Additional Past Medical History / Comment(s): AAA


History of Any Multi-Drug Resistant Organisms: None Reported


Additional Past Surgical History / Comment(s): AAA repair - 7 years ago at [facility]


Past Anesthesia/Blood Transfusion Reactions: No Reported Reaction


Past Psychological History: No Psychological Hx Reported


Smoking Status: Never smoker


Past Alcohol Use History: Abuse, Daily, Heavy


Past Drug Use History: None Reported





Medications and Allergies


                                Home Medications











 Medication  Instructions  Recorded  Confirmed  Type


 


Aspirin EC [Ecotrin Low Dose] 81 mg PO DAILY 12/18/21 05/04/22 History


 


Doxazosin [Cardura] 4 mg PO DAILY #30 tab 12/20/21 05/04/22 Rx


 


Cyclobenzaprine [Flexeril] 10 mg PO HS #30 tab 03/27/22 05/04/22 Rx


 


Albuterol Inhaler [Ventolin Hfa 2 puff INHALATION RT-QID PRN 05/04/22 05/04/22 

History





Inhaler]    


 


Budesonide/Formoterol Fumarate 2 puff INHALATION RT-BID 05/04/22 05/04/22 

History





[Symbicort 160-4.5 Mcg Inhaler]    


 


Ergocalciferol (Vitamin D2) 1,250 mcg PO Q7D 05/04/22 05/04/22 History





[Drisdol (50,000 Iu)]    


 


Furosemide [Lasix] 20 mg PO DAILY 05/04/22 05/04/22 History


 


Labetalol HCl [Trandate] 300 mg PO DAILY 05/04/22 05/04/22 History


 


Loperamide HCl [Imodium A-D] 2 - 4 mg PO QID PRN 05/04/22 05/04/22 History


 


cloNIDine HCL [Catapres] 0.3 mg PO TID 05/04/22 05/04/22 History


 


lisinopriL [Zestril] 20 mg PO DAILY 05/04/22 05/04/22 History








                                    Allergies











Allergy/AdvReac Type Severity Reaction Status Date / Time


 


hydralazine Allergy  Unknown Verified 05/04/22 16:45


 


unknown b/p med AdvReac  joint pain Uncoded 05/04/22 13:33














Physical Exam


Vitals: 


                                   Vital Signs











  Temp Pulse Pulse Resp BP BP Pulse Ox


 


 05/05/22 09:53    112 H  22   


 


 05/05/22 08:19    115 H  22   155/58  98


 


 05/05/22 07:58    107 H  19   144/79  98


 


 05/05/22 07:39    108 H  19   136/73  95


 


 05/05/22 07:14  97.9 F   109 H  18   113/69  96


 


 05/04/22 21:25     120 H  147/84   97


 


 05/04/22 20:50  98 F  112 H   16  136/78   97


 


 05/04/22 19:00  98.3 F  114 H   16  113/78   97


 


 05/04/22 13:29  98.2 F  116 H   15  132/77   98








                                Intake and Output











 05/04/22 05/05/22 05/05/22





 22:59 06:59 14:59


 


Intake Total 650  


 


Balance 650  


 


Intake:   


 


  Oral 650  


 


Other:   


 


  Voiding Method   Urinal





   Incontinent


 


  # Voids  1 


 


  # Bowel Movements  0 


 


  Weight  115.666 kg 














Results





- Lab Results


                             Most recent lab results











Calcium  8.5 mg/dL (8.4-10.2)   05/05/22  04:00    


 


Phosphorus  5.2 mg/dL (2.5-4.5)  H  05/05/22  04:00    


 


Magnesium  1.5 mg/dL (1.6-2.3)  L  05/05/22  04:00    














                                 05/05/22 04:00





                                 05/05/22 04:00





Assessment and Plan


Plan: 





Assessment: 


1.  Acute kidney injury mostly prerenal from poor intake.  Also concern for 

pigment nephropathy due to elevated bilirubin as well as hepatorenal syndrome.  

Rule out urinary retention.  No hydronephrosis noted on CAT scan.  Creatinine in

 December 2021 was 0.79 and 1.77 today.


2.  Metabolic acidosis secondary to acute kidney injury and lactic acidosis.


3.  Acute liver failure with ascites.  CAT scan shows fatty infiltration of the 

liver.  GI consulted.


4.  Hyponatremia.  Hypervolemic and also component of excess fluid intake.


5.  Hepatic encephalopathy.


6.  Hypomagnesemia from poor intake.  Being replaced.


7.  Benign hypertension.





Plan:


Maintain normal saline.


1500 mL fluid resection.


Low-salt diet.


Repeat BMP at noon.


Straight catheterization to make sure no urinary retention.  Pierre catheter will

 be placed if needed.


Schedule for paracentesis.  I will give him albumin pre-and post procedure.


Hold lisinopril.


Hold antihypertensives for systolic blood pressure less than 120.


Avoid nephrotoxins.


Will start diuretics later today pending labs.





Thank you for the consultation.  I will continue to follow the patient with you 

during his hospital stay.

## 2022-05-05 NOTE — PN
PROGRESS NOTE



DATE OF SERVICE:

05/05/2022



This 52-year-old gentleman who was admitted with weakness, abdominal pain and jaundice

has possible alcoholic hepatitis.  Patient also had elevated ammonia.  Patient has

significant ascites also. The patient is severely jaundiced.  Patient complains of

weakness. Past medical history reviewed.



Lactic acid is still elevated



REVIEW OF SYSTEMS:

Fourteen-point review of systems negative except as mentioned earlier.



PHYSICAL EXAMINATION:

GENERAL APPEARANCE: The patient is conscious but distressed. Significant abdominal

distention present, massive.

VITAL SIGNS: Pulse is 116, blood pressure 170/90, respiration 22.

HEENT: Conjunctivae deeply icteric. Oral mucosa deeply icteric.

NECK: No jugular venous distention.

CARDIOVASCULAR: S1, S2 muffled.

RESPIRATION: A few scattered rhonchi.

ABDOMEN: Distended, tense. Significant ascites present.

LEGS: Minimal edema.

NERVOUS SYSTEM: Diffusely weak. Hepatic flap present.



LABS:

.3, sodium 125 total. Bilirubin is 29.1. Lactic acid 13.4. AST is 1310.  Rest of

the labs are noted.



ASSESSMENT:

1. Acute hepatic failure secondary to alcoholic hepatitis and cirrhosis of the liver.

2. Severe hyperbilirubinemia.

3. Possible peripheral neuropathy.

4. Ascites.

5. Gait dysfunction.

6. History of ETOH.

7. Hepatic encephalopathy.

8. History of chronic abdominal aortic dissection.

9. Obesity.



RECOMMENDATIONS AND DISCUSSION:

I recommend to continue current medications, continue with the monitoring, symptomatic

treatment.  Repeat labs.  Continue lactulose.  Monitor creatinine closely.  Monitor

ammonia closely.  Monitor bilirubin closely. Continue to follow with multiple

consultants.  Prognosis extremely guarded.  I would also recommend vitamin K and repeat

PT/INR and possible ascitic tap and further studies.  Empiric antibiotics.  Obtain

cultures.  See orders for further details.  Further recommendations to follow.





MMODL / IJN: [id number] / Job#: [id number]

## 2022-05-05 NOTE — P.CONS
History of Present Illness





- Reason for Consult


Consult date: 05/05/22


Liver failure, ascites, hyperbilirubinemia


Requesting physician: Veronica Lira





- Chief Complaint


Jaundice





- History of Present Illness





This is a 52 year old white male who presented to the emergency department with 

concerns for yellowing eyes and skin.  He has past medical hisotry of 

hypertension and thoracic aortic aneurysm with repair about six years ago. He 

states his family started noticing he was yellow 1-2 days ago.  He states his 

urine has been dark for the past 2-3 days.  He has also noted abdominal 

distention for about 2-3 days.  He admits to drinking 2-3 tall beers a day for 

at least the past 10 years.  He denies any previous knowledge of liver disease. 

He denies any history of hepatitis.  He has not started any new medications, no 

recent antibiotic use. He is also having difficulty with voiding. 





He denies any chest pain or shortness of breath.  He does admit to abdominal 

discomfort and swelling.  He is afebrile.  An A team was called this morning due

to abnormal labs with elevated lactic acid.  





WBC 8 Hg 13 HCT 40.8 platelet count 76,000 INR 3.7 Sodium 125 potassium 4.9 BUN 

4 Creatinine 2.15 glucose 37 Plasma lactic acid 11.2


Total bilirubin 29 AST 1310  Alk phos 183 Ammonia 86 Acute hepatitis 

panel non-reactive





CT abdomen/pelvic reports Ascites, dilated appendix, thickened hepatic flexure 

correlate for colitis, AAA 4.1 with dissection that appears the same as previous

in 2021





Review of Systems





REVIEW OF SYSTEMS:


CARDIOPULMONARY: No chest pain or shortness of breath.  


Gastrointestinal: Abdominal discomfort with distention.  Burning sensation in 

epigastric region, no associated abdominal pain.   No nausea or vomiting.  No 

hematemesis, coffee-ground emesis.  No rectal bleeding, or melena.


GENITOURINARY:  No dysuria or hematuria. Dark urine.  Urinary retention.


MUSCULOSKELETAL: Reports normal range of motion


SKIN: No rashes.  Jaundice.


ENDOCRINE: No chills, fevers.  No excessive weight gain or loss.  No polydipsia 

or polyuria.


PSYCHIATRIC: Unremarkable. 


NEUROLOGY: No change in mental status.  Denies dizziness, headache.


ENT: Vision unremarkable. 


CONSTITUTIONAL: No recent weight loss. No fever, chills, night sweats. 





Past Medical History


Past Medical History: Hypertension


Additional Past Medical History / Comment(s): AAA


History of Any Multi-Drug Resistant Organisms: None Reported


Additional Past Surgical History / Comment(s): AAA repair - 7 years ago at [facility]


Past Anesthesia/Blood Transfusion Reactions: No Reported Reaction


Past Psychological History: No Psychological Hx Reported


Smoking Status: Never smoker


Past Alcohol Use History: Abuse, Daily, Heavy


Past Drug Use History: None Reported





Medications and Allergies


                                Home Medications











 Medication  Instructions  Recorded  Confirmed  Type


 


Aspirin EC [Ecotrin Low Dose] 81 mg PO DAILY 12/18/21 05/04/22 History


 


Doxazosin [Cardura] 4 mg PO DAILY #30 tab 12/20/21 05/04/22 Rx


 


Cyclobenzaprine [Flexeril] 10 mg PO HS #30 tab 03/27/22 05/04/22 Rx


 


Albuterol Inhaler [Ventolin Hfa 2 puff INHALATION RT-QID PRN 05/04/22 05/04/22 

History





Inhaler]    


 


Budesonide/Formoterol Fumarate 2 puff INHALATION RT-BID 05/04/22 05/04/22 

History





[Symbicort 160-4.5 Mcg Inhaler]    


 


Ergocalciferol (Vitamin D2) 1,250 mcg PO Q7D 05/04/22 05/04/22 History





[Drisdol (50,000 Iu)]    


 


Furosemide [Lasix] 20 mg PO DAILY 05/04/22 05/04/22 History


 


Labetalol HCl [Trandate] 300 mg PO DAILY 05/04/22 05/04/22 History


 


Loperamide HCl [Imodium A-D] 2 - 4 mg PO QID PRN 05/04/22 05/04/22 History


 


cloNIDine HCL [Catapres] 0.3 mg PO TID 05/04/22 05/04/22 History


 


lisinopriL [Zestril] 20 mg PO DAILY 05/04/22 05/04/22 History








                                    Allergies











Allergy/AdvReac Type Severity Reaction Status Date / Time


 


hydralazine Allergy  Unknown Verified 05/04/22 16:45


 


unknown b/p med AdvReac  joint pain Uncoded 05/04/22 13:33














Physical Exam


Vitals: 


                                   Vital Signs











  Temp Pulse Pulse Resp BP BP Pulse Ox


 


 05/05/22 08:19    115 H  22   155/58  98


 


 05/05/22 07:58    107 H  19   144/79  98


 


 05/05/22 07:39    108 H  19   136/73  95


 


 05/05/22 07:14  97.9 F   109 H  18   113/69  96


 


 05/04/22 21:25     120 H  147/84   97


 


 05/04/22 20:50  98 F  112 H   16  136/78   97


 


 05/04/22 19:00  98.3 F  114 H   16  113/78   97


 


 05/04/22 13:29  98.2 F  116 H   15  132/77   98








                                Intake and Output











 05/04/22 05/05/22 05/05/22





 22:59 06:59 14:59


 


Intake Total 650  


 


Balance 650  


 


Intake:   


 


  Oral 650  


 


Other:   


 


  # Voids  1 


 


  # Bowel Movements  0 


 


  Weight  115.666 kg 














General appearance: The patient is alert, oriented, appears in no acute 

distress.


HET: Head is normocephalic and atraumatic.  Conjunctiva pink.  Sclera deeply 

icteric.


Neck: Supple without lymphadenopathy.  Trachea midline.


Heart: S1 S2.  Regular rate and rhythm.


Lungs: Clear to auscultation.


Abdomen: Soft,tender with distention, ascites, positive bowel sounds.  No g

uarding or rigidity.


Skin:  No rashes. No jaundice.


Extremities: Normal skin color and turgor.  Bilateral pedal edema


Neurological: No focal deficits.  Alert and oriented x3.





Results


CBC & Chem 7: 


                                 05/05/22 10:58





                                 05/05/22 10:58


Labs: 


                  Abnormal Lab Results - Last 24 Hours (Table)











  05/05/22 05/05/22 05/05/22 Range/Units





  03:15 04:00 04:00 


 


RBC   3.34 L   (4.30-5.90)  m/uL


 


Hgb   12.9 L   (13.0-17.5)  gm/dL


 


MCV   117.2 H   (80.0-100.0)  fL


 


MCH   38.8 H   (25.0-35.0)  pg


 


RDW   17.7 H   (11.5-15.5)  %


 


Plt Count   89 L   (150-450)  k/uL


 


Neutrophils #   8.6 H   (1.3-7.7)  k/uL


 


Lymphocytes #   0.4 L   (1.0-4.8)  k/uL


 


Macrocytosis   Marked A   


 


PT    28.6 H  (9.0-12.0)  sec


 


INR    2.9 H  (<1.2)  


 


APTT    69.0 H  (22.0-30.0)  sec


 


Sodium     (137-145)  mmol/L


 


Chloride     ()  mmol/L


 


Carbon Dioxide     (22-30)  mmol/L


 


BUN     (9-20)  mg/dL


 


Creatinine     (0.66-1.25)  mg/dL


 


Glucose     (74-99)  mg/dL


 


POC Glucose (mg/dL)     (75-99)  mg/dL


 


Plasma Lactic Acid Evangelista     (0.7-2.0)  mmol/L


 


Phosphorus     (2.5-4.5)  mg/dL


 


Magnesium     (1.6-2.3)  mg/dL


 


Total Bilirubin     (0.2-1.3)  mg/dL


 


AST     (17-59)  U/L


 


ALT     (4-49)  U/L


 


Alkaline Phosphatase     ()  U/L


 


Ammonia     (<30)  umol/L


 


Albumin     (3.5-5.0)  g/dL


 


Urine Protein  Trace H    (Negative)  


 


Urine Blood  Trace H    (Negative)  


 


Urine Bilirubin  4+ H    (Negative)  


 


Urine WBC  19 H    (0-5)  /hpf


 


Amorphous Sediment  Few H    (None)  /hpf


 


Urine Bacteria  Few H    (None)  /hpf


 


Hyaline Casts  23 H    (0-2)  /lpf


 


Urine Mucus  Occasional H    (None)  /hpf














  05/05/22 05/05/22 05/05/22 Range/Units





  04:00 04:00 05:51 


 


RBC     (4.30-5.90)  m/uL


 


Hgb     (13.0-17.5)  gm/dL


 


MCV     (80.0-100.0)  fL


 


MCH     (25.0-35.0)  pg


 


RDW     (11.5-15.5)  %


 


Plt Count     (150-450)  k/uL


 


Neutrophils #     (1.3-7.7)  k/uL


 


Lymphocytes #     (1.0-4.8)  k/uL


 


Macrocytosis     


 


PT     (9.0-12.0)  sec


 


INR     (<1.2)  


 


APTT     (22.0-30.0)  sec


 


Sodium  127 L    (137-145)  mmol/L


 


Chloride  90 L    ()  mmol/L


 


Carbon Dioxide  18 L    (22-30)  mmol/L


 


BUN  3 L    (9-20)  mg/dL


 


Creatinine  1.77 H    (0.66-1.25)  mg/dL


 


Glucose  37 L*    (74-99)  mg/dL


 


POC Glucose (mg/dL)    40 L  (75-99)  mg/dL


 


Plasma Lactic Acid Evangelista   10.2 H*   (0.7-2.0)  mmol/L


 


Phosphorus  5.2 H    (2.5-4.5)  mg/dL


 


Magnesium  1.5 L    (1.6-2.3)  mg/dL


 


Total Bilirubin  28.2 H*    (0.2-1.3)  mg/dL


 


AST  649 H    (17-59)  U/L


 


ALT  66 H    (4-49)  U/L


 


Alkaline Phosphatase  207 H    ()  U/L


 


Ammonia   86 H   (<30)  umol/L


 


Albumin  2.7 L    (3.5-5.0)  g/dL


 


Urine Protein     (Negative)  


 


Urine Blood     (Negative)  


 


Urine Bilirubin     (Negative)  


 


Urine WBC     (0-5)  /hpf


 


Amorphous Sediment     (None)  /hpf


 


Urine Bacteria     (None)  /hpf


 


Hyaline Casts     (0-2)  /lpf


 


Urine Mucus     (None)  /hpf














  05/05/22 05/05/22 05/05/22 Range/Units





  06:21 07:19 07:23 


 


RBC     (4.30-5.90)  m/uL


 


Hgb     (13.0-17.5)  gm/dL


 


MCV     (80.0-100.0)  fL


 


MCH     (25.0-35.0)  pg


 


RDW     (11.5-15.5)  %


 


Plt Count     (150-450)  k/uL


 


Neutrophils #     (1.3-7.7)  k/uL


 


Lymphocytes #     (1.0-4.8)  k/uL


 


Macrocytosis     


 


PT     (9.0-12.0)  sec


 


INR     (<1.2)  


 


APTT     (22.0-30.0)  sec


 


Sodium     (137-145)  mmol/L


 


Chloride     ()  mmol/L


 


Carbon Dioxide     (22-30)  mmol/L


 


BUN     (9-20)  mg/dL


 


Creatinine     (0.66-1.25)  mg/dL


 


Glucose     (74-99)  mg/dL


 


POC Glucose (mg/dL)  101 H   66 L  (75-99)  mg/dL


 


Plasma Lactic Acid Evangelista   11.9 H*   (0.7-2.0)  mmol/L


 


Phosphorus     (2.5-4.5)  mg/dL


 


Magnesium     (1.6-2.3)  mg/dL


 


Total Bilirubin     (0.2-1.3)  mg/dL


 


AST     (17-59)  U/L


 


ALT     (4-49)  U/L


 


Alkaline Phosphatase     ()  U/L


 


Ammonia     (<30)  umol/L


 


Albumin     (3.5-5.0)  g/dL


 


Urine Protein     (Negative)  


 


Urine Blood     (Negative)  


 


Urine Bilirubin     (Negative)  


 


Urine WBC     (0-5)  /hpf


 


Amorphous Sediment     (None)  /hpf


 


Urine Bacteria     (None)  /hpf


 


Hyaline Casts     (0-2)  /lpf


 


Urine Mucus     (None)  /hpf














  05/05/22 05/05/22 Range/Units





  08:31 08:46 


 


RBC    (4.30-5.90)  m/uL


 


Hgb    (13.0-17.5)  gm/dL


 


MCV    (80.0-100.0)  fL


 


MCH    (25.0-35.0)  pg


 


RDW    (11.5-15.5)  %


 


Plt Count    (150-450)  k/uL


 


Neutrophils #    (1.3-7.7)  k/uL


 


Lymphocytes #    (1.0-4.8)  k/uL


 


Macrocytosis    


 


PT    (9.0-12.0)  sec


 


INR    (<1.2)  


 


APTT    (22.0-30.0)  sec


 


Sodium    (137-145)  mmol/L


 


Chloride    ()  mmol/L


 


Carbon Dioxide    (22-30)  mmol/L


 


BUN    (9-20)  mg/dL


 


Creatinine    (0.66-1.25)  mg/dL


 


Glucose    (74-99)  mg/dL


 


POC Glucose (mg/dL)  61 L  62 L  (75-99)  mg/dL


 


Plasma Lactic Acid Evangelista    (0.7-2.0)  mmol/L


 


Phosphorus    (2.5-4.5)  mg/dL


 


Magnesium    (1.6-2.3)  mg/dL


 


Total Bilirubin    (0.2-1.3)  mg/dL


 


AST    (17-59)  U/L


 


ALT    (4-49)  U/L


 


Alkaline Phosphatase    ()  U/L


 


Ammonia    (<30)  umol/L


 


Albumin    (3.5-5.0)  g/dL


 


Urine Protein    (Negative)  


 


Urine Blood    (Negative)  


 


Urine Bilirubin    (Negative)  


 


Urine WBC    (0-5)  /hpf


 


Amorphous Sediment    (None)  /hpf


 


Urine Bacteria    (None)  /hpf


 


Hyaline Casts    (0-2)  /lpf


 


Urine Mucus    (None)  /hpf











Comments: 





CT abdomen/pelvic reports Ascites, dilated appendix, thickened hepatic flexure 

correlate for colitis, AAA 4.1 with dissection that appears the same as previous

 in 2021





Assessment and Plan


(1) Alcoholic hepatitis


Narrative/Plan: 


This is a 52 year old male who presented with jaundice and noted to have 

elevated bilirubin and LFTS, as well as lactic acid.  Labs are consistent with 

acute alcoholic hepatitis. He has a significant history of alcohol abuse and 

admits to drinking 2-3 tall beers a day for the last 10 years.  He denies any 

new medicaitons or antibiotic use. NO prior history of liver disease.  Avoid 

hepatotoxic medications, continue supportive care.  Will repeat labs tomorrow 

and monitor closely.  May need to consider transfer to tertiary center if no 

improvement.


Current Visit: Yes   Status: Acute   Code(s): K70.10 - ALCOHOLIC HEPATITIS 

WITHOUT ASCITES   SNOMED Code(s): 151119838


   





(2) Ascites


Narrative/Plan: 


Paracentesis when INR stable


Current Visit: Yes   Status: Acute   Code(s): R18.8 - OTHER ASCITES   SNOMED 

Code(s): 810495343


   





(3) Jaundice


Current Visit: Yes   Status: Acute   Code(s): R17 - UNSPECIFIED JAUNDICE   

SNOMED Code(s): 13675468


   





(4) Hyponatremia


Narrative/Plan: 


Nephrology following, will defer diuretics to them.  


Current Visit: Yes   Status: Acute   Code(s): E87.1 - HYPO-OSMOLALITY AND 

HYPONATREMIA   SNOMED Code(s): 44059515


   


Plan: 





1.  Continue symptomatic and supportive care


2.  Avoid hepatoxic medications


3.  Alcohol abstinence


4.  Daily CBC, CMP, INR, ammonia


5.  Start Lactulose 30 GM TID


6.  Low sodium diet


7.  Defer diuretics to nephrology


8.  Paracentesis once INR and patient stable


Thank you for this consultation, we will continue to monitor.








Dr. MARIS Segura


I agree with the dictator's note, documented as a scribe by Sravanthi JAUREGUI.

## 2022-05-05 NOTE — OP
OPERATIVE REPORT



OPERATIVE REPORT:

Placement of right femoral triple-lumen catheter.



PREOPERATIVE DIAGNOSIS:

Acute hepatic failure and ascites.



POSTOPERATIVE DIAGNOSIS:

Acute hepatic failure and ascites.



ANESTHESIA USED:

Two mL of 1% lidocaine.



PROCEDURE DESCRIPTION:

The patient was placed in supine position. The right groin was prepared in a sterile

fashion and drapes were applied.  The right femoral vein was cannulated, and a

guidewire was placed.  A triple-lumen catheter was inserted over the guidewire, and the

guidewire was removed.  Good blood flow was noted in the 3 different ports of the

triple-lumen catheter. No evidence of complications. Line was secured using 3.0 silk

sutures.  There was evidence of good flow in the 3 different ports of the triple-lumen

catheter.





MMODL / IJN: [id number] / Job#: [id number]

## 2022-05-05 NOTE — P.CNPUL
History of Present Illness


Consult date: 05/05/22


Requesting physician: Emerald Huffman


Reason for consult: other (ICU management)


Chief complaint: Weakness, and abdominal distention


History of present illness: 





This is a 52-year-old white male with history of alcoholism, chronic dissection 

of abdominal aortic aneurysm, history of hypertension, patient was brought in 

yesterday with multiple complaints including abdominal distention, weakness in t

he legs, and turning kilo/jaundice.  Patient was evaluated in the ER, and he was

found to have significant abnormal labs, and significant ascites/anasarca.  

Patient was also found to have significant abnormal liver enzymes, elevated INR 

of 3.7, hyponatremia with a sodium of 125, anion gap metabolic acidosis with 

bicarb of 14, elevated lactic acid, and acute kidney injury with a creatinine of

2.15.  Patient was also noted to be jaundiced, his total bilirubin was 29.1.  

AST was 1310  and alkaline phosphatase 183.  Obviously the patient has 

multiple organ failure, and he is yet to undergo a paracentesis by 

interventional radiology, considering his complex medical issues, I was asked to

see him and possibly transferred to the ICU.  I did arrange for the patient to 

transfer to the ICU, will start the patient on antibiotics for his ascites.  

Will recommend GI evaluation, and he is also being seen by nephrology.  Patient 

is not a great historian but he was told by family members that he is turning 

yellow only a few days ago.  Patient has been complaining of increased abdominal

girth for the last a few weeks, and has been generally weak and feeling numb in 

his lower extremities.  Denies any cough denies any fever no chills, denies any 

shortness of breath.  CT of the abdomen and pelvis clearly showed ascites, and 

it also showed appendix to be somewhat dilated the radiologist raised the 

possibility of acute appendicitis.  There was evidence of moderate diffuse fatty

infiltration of the liver there was also evidence of abdominal aortic aneurysm 

with dissection the same compared to previous CT of the abdomen back in 2021.  

Obviously the patient has chronic aortic dissection.





Review of Systems





Constitutional: Weakness fatigue malaise and more weakness in the lower 

extremities.


HEENT: Patient describes turning yellow and jaundiced over the last few days.


Pulmonary: Negative


Cardiac: Negative


GI: As noted in HPI


Genitourinary: Negative


Musculoskeletal: Weakness


Psychiatric: Negative


Hematologic: Negative


Endocrine: Negative


Musculoskeletal: Weakness


Psychiatric: Negative


Neurologic: Negative





Past Medical History


Past Medical History: Hypertension


Additional Past Medical History / Comment(s): AAA


History of Any Multi-Drug Resistant Organisms: None Reported


Additional Past Surgical History / Comment(s): AAA repair - 7 years ago at [facility]


Past Anesthesia/Blood Transfusion Reactions: No Reported Reaction


Past Psychological History: No Psychological Hx Reported


Smoking Status: Never smoker


Past Alcohol Use History: Abuse, Daily, Heavy


Past Drug Use History: None Reported





Medications and Allergies


                                Home Medications











 Medication  Instructions  Recorded  Confirmed  Type


 


Aspirin EC [Ecotrin Low Dose] 81 mg PO DAILY 12/18/21 05/04/22 History


 


Doxazosin [Cardura] 4 mg PO DAILY #30 tab 12/20/21 05/04/22 Rx


 


Cyclobenzaprine [Flexeril] 10 mg PO HS #30 tab 03/27/22 05/04/22 Rx


 


Albuterol Inhaler [Ventolin Hfa 2 puff INHALATION RT-QID PRN 05/04/22 05/04/22 

History





Inhaler]    


 


Budesonide/Formoterol Fumarate 2 puff INHALATION RT-BID 05/04/22 05/04/22 

History





[Symbicort 160-4.5 Mcg Inhaler]    


 


Ergocalciferol (Vitamin D2) 1,250 mcg PO Q7D 05/04/22 05/04/22 History





[Drisdol (50,000 Iu)]    


 


Furosemide [Lasix] 20 mg PO DAILY 05/04/22 05/04/22 History


 


Labetalol HCl [Trandate] 300 mg PO DAILY 05/04/22 05/04/22 History


 


Loperamide HCl [Imodium A-D] 2 - 4 mg PO QID PRN 05/04/22 05/04/22 History


 


cloNIDine HCL [Catapres] 0.3 mg PO TID 05/04/22 05/04/22 History


 


lisinopriL [Zestril] 20 mg PO DAILY 05/04/22 05/04/22 History








                                    Allergies











Allergy/AdvReac Type Severity Reaction Status Date / Time


 


hydralazine Allergy  Unknown Verified 05/04/22 16:45


 


unknown b/p med AdvReac  joint pain Uncoded 05/04/22 13:33














Physical Exam


Vitals: 


                                   Vital Signs











  Temp Pulse Pulse Resp BP BP Pulse Ox


 


 05/05/22 13:28    116 H  18   145/72  98


 


 05/05/22 11:39    116 H  22   170/90  100


 


 05/05/22 10:20    117 H  24   144/80  98


 


 05/05/22 09:53    112 H  22   


 


 05/05/22 08:19    115 H  22   155/58  98


 


 05/05/22 07:58    107 H  19   144/79  98


 


 05/05/22 07:39    108 H  19   136/73  95


 


 05/05/22 07:14  97.9 F   109 H  18   113/69  96


 


 05/04/22 21:25     120 H  147/84   97


 


 05/04/22 20:50  98 F  112 H   16  136/78   97


 


 05/04/22 19:00  98.3 F  114 H   16  113/78   97








                                Intake and Output











 05/04/22 05/05/22 05/05/22





 22:59 06:59 14:59


 


Intake Total 650  


 


Output Total   20


 


Balance 650  -20


 


Intake:   


 


  Oral 650  


 


Output:   


 


  Urine   20


 


    Straight   20


 


Other:   


 


  Voiding Method   Urinal





   Incontinent


 


  # Voids  1 


 


  # Bowel Movements  0 


 


  Weight  115.666 kg 














Physical Exam: Revealed a 52-year-old white male, looks jaundiced, however he is

 not in respiratory distress.


Head: Atraumatic, normocephalic.


HEENT:[Neck is supple.] [No neck masses.] [No thyromegaly.] [No JVD.]


Chest: [Clear throughout, no crackles, no rhonchi, no wheezes.]


Cardiac Exam: [Normal S1 and S2, no S3 gallop, no murmur.]


Abdomen: Obese, positive ascites, nontender, no rebound, no guarding.


Extremities: 2+ bipedal edema, no clubbing, no cyanosis.]


Neurological Exam: Alert and oriented 3 focal neurologic deficits.  However 

apparently the patient has gait dysfunction could not assess his gait today.


Psychiatric: Normal mood affect and normal mental status examination.


Skin patient is jaundiced otherwise unremarkable.





Results





- Laboratory Findings


CBC and BMP: 


                                 05/05/22 10:58





                                 05/05/22 10:58


PT/INR, D-dimer











PT  36.6 sec (9.0-12.0)  H  05/05/22  11:56    


 


INR  3.7  (<1.2)  H  05/05/22  11:56    








Abnormal lab findings: 


                                  Abnormal Labs











  05/05/22 05/05/22 05/05/22





  03:15 04:00 04:00


 


RBC   3.34 L 


 


Hgb   12.9 L 


 


MCV   117.2 H 


 


MCH   38.8 H 


 


RDW   17.7 H 


 


Plt Count   89 L 


 


Neutrophils #   8.6 H 


 


Lymphocytes #   0.4 L 


 


Monocytes #   


 


Macrocytosis   Marked A 


 


PT    28.6 H


 


INR    2.9 H


 


APTT    69.0 H


 


Sodium   


 


Chloride   


 


Carbon Dioxide   


 


BUN   


 


Creatinine   


 


Glucose   


 


POC Glucose (mg/dL)   


 


Plasma Lactic Acid Evangelista   


 


Calcium   


 


Phosphorus   


 


Magnesium   


 


Total Bilirubin   


 


AST   


 


ALT   


 


Alkaline Phosphatase   


 


Ammonia   


 


Albumin   


 


Urine Protein  Trace H  


 


Urine Blood  Trace H  


 


Urine Bilirubin  4+ H  


 


Urine WBC  19 H  


 


Amorphous Sediment  Few H  


 


Urine Bacteria  Few H  


 


Hyaline Casts  23 H  


 


Urine Mucus  Occasional H  














  05/05/22 05/05/22 05/05/22





  04:00 04:00 05:51


 


RBC   


 


Hgb   


 


MCV   


 


MCH   


 


RDW   


 


Plt Count   


 


Neutrophils #   


 


Lymphocytes #   


 


Monocytes #   


 


Macrocytosis   


 


PT   


 


INR   


 


APTT   


 


Sodium  127 L  


 


Chloride  90 L  


 


Carbon Dioxide  18 L  


 


BUN  3 L  


 


Creatinine  1.77 H  


 


Glucose  37 L*  


 


POC Glucose (mg/dL)    40 L


 


Plasma Lactic Acid Evangelista   10.2 H* 


 


Calcium   


 


Phosphorus  5.2 H  


 


Magnesium  1.5 L  


 


Total Bilirubin  28.2 H*  


 


AST  649 H  


 


ALT  66 H  


 


Alkaline Phosphatase  207 H  


 


Ammonia   86 H 


 


Albumin  2.7 L  


 


Urine Protein   


 


Urine Blood   


 


Urine Bilirubin   


 


Urine WBC   


 


Amorphous Sediment   


 


Urine Bacteria   


 


Hyaline Casts   


 


Urine Mucus   














  05/05/22 05/05/22 05/05/22





  06:21 07:19 07:23


 


RBC   


 


Hgb   


 


MCV   


 


MCH   


 


RDW   


 


Plt Count   


 


Neutrophils #   


 


Lymphocytes #   


 


Monocytes #   


 


Macrocytosis   


 


PT   


 


INR   


 


APTT   


 


Sodium   


 


Chloride   


 


Carbon Dioxide   


 


BUN   


 


Creatinine   


 


Glucose   


 


POC Glucose (mg/dL)  101 H   66 L


 


Plasma Lactic Acid Evangelista   11.9 H* 


 


Calcium   


 


Phosphorus   


 


Magnesium   


 


Total Bilirubin   


 


AST   


 


ALT   


 


Alkaline Phosphatase   


 


Ammonia   


 


Albumin   


 


Urine Protein   


 


Urine Blood   


 


Urine Bilirubin   


 


Urine WBC   


 


Amorphous Sediment   


 


Urine Bacteria   


 


Hyaline Casts   


 


Urine Mucus   














  05/05/22 05/05/22 05/05/22





  08:31 08:46 09:10


 


RBC   


 


Hgb   


 


MCV   


 


MCH   


 


RDW   


 


Plt Count   


 


Neutrophils #   


 


Lymphocytes #   


 


Monocytes #   


 


Macrocytosis   


 


PT   


 


INR   


 


APTT   


 


Sodium   


 


Chloride   


 


Carbon Dioxide   


 


BUN   


 


Creatinine   


 


Glucose   


 


POC Glucose (mg/dL)  61 L  62 L  67 L


 


Plasma Lactic Acid Evangelista   


 


Calcium   


 


Phosphorus   


 


Magnesium   


 


Total Bilirubin   


 


AST   


 


ALT   


 


Alkaline Phosphatase   


 


Ammonia   


 


Albumin   


 


Urine Protein   


 


Urine Blood   


 


Urine Bilirubin   


 


Urine WBC   


 


Amorphous Sediment   


 


Urine Bacteria   


 


Hyaline Casts   


 


Urine Mucus   














  05/05/22 05/05/22 05/05/22





  09:30 09:51 10:14


 


RBC   


 


Hgb   


 


MCV   


 


MCH   


 


RDW   


 


Plt Count   


 


Neutrophils #   


 


Lymphocytes #   


 


Monocytes #   


 


Macrocytosis   


 


PT   


 


INR   


 


APTT   


 


Sodium   


 


Chloride   


 


Carbon Dioxide   


 


BUN   


 


Creatinine   


 


Glucose   


 


POC Glucose (mg/dL)  61 L  63 L  67 L


 


Plasma Lactic Acid Evangelista   


 


Calcium   


 


Phosphorus   


 


Magnesium   


 


Total Bilirubin   


 


AST   


 


ALT   


 


Alkaline Phosphatase   


 


Ammonia   


 


Albumin   


 


Urine Protein   


 


Urine Blood   


 


Urine Bilirubin   


 


Urine WBC   


 


Amorphous Sediment   


 


Urine Bacteria   


 


Hyaline Casts   


 


Urine Mucus   














  05/05/22 05/05/22 05/05/22





  10:34 10:58 10:58


 


RBC   


 


Hgb   


 


MCV   


 


MCH   


 


RDW   


 


Plt Count   


 


Neutrophils #   


 


Lymphocytes #   


 


Monocytes #   


 


Macrocytosis   


 


PT   


 


INR   


 


APTT   


 


Sodium   125 L 


 


Chloride   89 L 


 


Carbon Dioxide   14 L 


 


BUN   4 L 


 


Creatinine   2.15 H 


 


Glucose   


 


POC Glucose (mg/dL)  132 H  


 


Plasma Lactic Acid Evangelista    13.4 H*


 


Calcium   8.2 L 


 


Phosphorus   


 


Magnesium   


 


Total Bilirubin   29.1 H* 


 


AST   1310 H 


 


ALT   109 H 


 


Alkaline Phosphatase   183 H 


 


Ammonia   


 


Albumin   2.6 L 


 


Urine Protein   


 


Urine Blood   


 


Urine Bilirubin   


 


Urine WBC   


 


Amorphous Sediment   


 


Urine Bacteria   


 


Hyaline Casts   


 


Urine Mucus   














  05/05/22 05/05/22 05/05/22





  10:58 11:43 11:56


 


RBC  3.30 L  


 


Hgb   


 


MCV  123.3 H D  


 


MCH  39.3 H  


 


RDW  18.7 H  


 


Plt Count  76 L  


 


Neutrophils #   


 


Lymphocytes #  0.4 L  


 


Monocytes #  1.1 H  


 


Macrocytosis  Marked A  


 


PT    36.6 H


 


INR    3.7 H


 


APTT   


 


Sodium   


 


Chloride   


 


Carbon Dioxide   


 


BUN   


 


Creatinine   


 


Glucose   


 


POC Glucose (mg/dL)   116 H 


 


Plasma Lactic Acid Evangelista   


 


Calcium   


 


Phosphorus   


 


Magnesium   


 


Total Bilirubin   


 


AST   


 


ALT   


 


Alkaline Phosphatase   


 


Ammonia   


 


Albumin   


 


Urine Protein   


 


Urine Blood   


 


Urine Bilirubin   


 


Urine WBC   


 


Amorphous Sediment   


 


Urine Bacteria   


 


Hyaline Casts   


 


Urine Mucus   














  05/05/22





  13:47


 


RBC 


 


Hgb 


 


MCV 


 


MCH 


 


RDW 


 


Plt Count 


 


Neutrophils # 


 


Lymphocytes # 


 


Monocytes # 


 


Macrocytosis 


 


PT 


 


INR 


 


APTT 


 


Sodium 


 


Chloride 


 


Carbon Dioxide 


 


BUN 


 


Creatinine 


 


Glucose 


 


POC Glucose (mg/dL)  115 H


 


Plasma Lactic Acid Evangelista 


 


Calcium 


 


Phosphorus 


 


Magnesium 


 


Total Bilirubin 


 


AST 


 


ALT 


 


Alkaline Phosphatase 


 


Ammonia 


 


Albumin 


 


Urine Protein 


 


Urine Blood 


 


Urine Bilirubin 


 


Urine WBC 


 


Amorphous Sediment 


 


Urine Bacteria 


 


Hyaline Casts 


 


Urine Mucus 














- Diagnostic Findings


Chest x-ray: image reviewed (No evidence of acute pulmonary disease)


Additional studies: 





CT of the abdomen and pelvis as noted in HPI





Assessment and Plan


Assessment: 





Impression:


Acute hepatic failure secondary to alcoholic hepatitis


Acute jaundice secondary to alcoholic liver disease


Severe hyperbilirubinemia secondary to above


Ascites secondary to above


History of alcoholism.


Benign essential hypertension.


Obesity.


Chronic abdominal aortic dissection.


Hyponatremia, likely secondary to fluid overload/hypervolemic hyponatremia or 

could be secondary to excessive drinking/alcoholism.


Acute kidney injury, no hydronephrosis on CT of the abdomen and pelvis would be 

concerned about his peritoneal syndrome or concerned about pressure and 

compartment syndrome on the bladder.


Acute metabolic acidosis secondary to acute kidney injury


Hypomagnesemia secondary to poor intake





Recommendation:


Patient will be transferred to the ICU


Agree with fluid restriction, 1500 a month or day.


Patient will have a Pierre catheter placed and will measure bladder pressures.


Patient to undergo paracentesis as soon as possible after correcting his 

coagulopathy related to his liver disease.  Patient has elevated INR.


Agree with holding lisinopril and antihypertensive for now.


Agree with antibiotics/Zosyn


Agree with close monitoring in the ICU.


We will continue to follow patient is critically ill.





Time with Patient: Greater than 30

## 2022-05-06 VITALS — TEMPERATURE: 98.3 F

## 2022-05-06 VITALS — DIASTOLIC BLOOD PRESSURE: 69 MMHG | SYSTOLIC BLOOD PRESSURE: 105 MMHG

## 2022-05-06 VITALS — RESPIRATION RATE: 20 BRPM | HEART RATE: 88 BPM

## 2022-05-06 LAB
ALBUMIN SERPL-MCNC: 2.3 G/DL (ref 3.5–5)
ALP SERPL-CCNC: 142 U/L (ref 38–126)
ALT SERPL-CCNC: 83 U/L (ref 4–49)
ANION GAP SERPL CALC-SCNC: 14 MMOL/L
AST SERPL-CCNC: 963 U/L (ref 17–59)
BUN SERPL-SCNC: 6 MG/DL (ref 9–20)
CALCIUM SPEC-MCNC: 8.5 MG/DL (ref 8.4–10.2)
CHLORIDE SERPL-SCNC: 87 MMOL/L (ref 98–107)
CO2 SERPL-SCNC: 25 MMOL/L (ref 22–30)
ERYTHROCYTE [DISTWIDTH] IN BLOOD BY AUTOMATED COUNT: 2.92 M/UL (ref 4.3–5.9)
ERYTHROCYTE [DISTWIDTH] IN BLOOD: 17.8 % (ref 11.5–15.5)
GLUCOSE SERPL-MCNC: 98 MG/DL (ref 74–99)
HCT VFR BLD AUTO: 34.5 % (ref 39–53)
HGB BLD-MCNC: 11.4 GM/DL (ref 13–17.5)
INR PPP: 2.8 (ref ?–1.2)
MAGNESIUM SPEC-SCNC: 1.9 MG/DL (ref 1.6–2.3)
MCH RBC QN AUTO: 39 PG (ref 25–35)
MCHC RBC AUTO-ENTMCNC: 33.1 G/DL (ref 31–37)
MCV RBC AUTO: 118 FL (ref 80–100)
PLATELET # BLD AUTO: 45 K/UL (ref 150–450)
POTASSIUM SERPL-SCNC: 3.9 MMOL/L (ref 3.5–5.1)
PROT SERPL-MCNC: 6.1 G/DL (ref 6.3–8.2)
PT BLD: 28.3 SEC (ref 9–12)
SODIUM SERPL-SCNC: 126 MMOL/L (ref 137–145)
WBC # BLD AUTO: 5.4 K/UL (ref 3.8–10.6)

## 2022-05-06 RX ADMIN — PHYTONADIONE SCH MG: 10 INJECTION, EMULSION INTRAMUSCULAR; INTRAVENOUS; SUBCUTANEOUS at 09:11

## 2022-05-06 RX ADMIN — BUDESONIDE AND FORMOTEROL FUMARATE DIHYDRATE SCH PUFF: 160; 4.5 AEROSOL RESPIRATORY (INHALATION) at 08:02

## 2022-05-06 RX ADMIN — Medication SCH: at 05:43

## 2022-05-06 RX ADMIN — LACTULOSE SCH GM: 20 SOLUTION ORAL at 14:36

## 2022-05-06 RX ADMIN — DOXAZOSIN MESYLATE SCH MG: 4 TABLET ORAL at 08:52

## 2022-05-06 RX ADMIN — ALBUMIN HUMAN SCH: 0.25 SOLUTION INTRAVENOUS at 11:13

## 2022-05-06 RX ADMIN — METOCLOPRAMIDE PRN MG: 5 INJECTION, SOLUTION INTRAMUSCULAR; INTRAVENOUS at 08:53

## 2022-05-06 RX ADMIN — PIPERACILLIN AND TAZOBACTAM SCH MLS/HR: 3; .375 INJECTION, POWDER, FOR SOLUTION INTRAVENOUS at 02:06

## 2022-05-06 RX ADMIN — LABETALOL HCL SCH MG: 100 TABLET, FILM COATED ORAL at 08:51

## 2022-05-06 RX ADMIN — LACTULOSE SCH GM: 20 SOLUTION ORAL at 09:02

## 2022-05-06 RX ADMIN — ALBUMIN HUMAN SCH: 0.25 SOLUTION INTRAVENOUS at 11:12

## 2022-05-06 RX ADMIN — PIPERACILLIN AND TAZOBACTAM SCH MLS/HR: 3; .375 INJECTION, POWDER, FOR SOLUTION INTRAVENOUS at 09:35

## 2022-05-06 NOTE — P.PN
Subjective


Progress Note Date: 05/06/22


Principal diagnosis: 





Liver disease





52-year-old male who was brought into the emergency department for concerns of 

jaundice and abdominal distention.  Patient has a significant history of alcohol

abuse and admits to 2-312 beers a day for at least the last 10 year duration.  

Denied any previous underlying liver disease that he was aware of.  No history 

of hepatitis.  There is noted on admission to have elevated total bilirubin as 

well as LFTs consistent with alcoholic hepatitis.  Patient was transferred to 

the ICU yesterday afternoon for elevated plasma lactic acid and concerns for 

advanced liver disease.  Patient is alert and oriented 3 this morning.  He was 

started on lactulose however had some nausea yesterday evening and was not given

his evening dose.  Had one bowel movement reported yesterday.  No bowel movement

so far today.  Patient received vitamin K yesterday.  Repeat labs today WBC 5.4 

hemoglobin 11.4 platelet count 45,000 INR 2.8 sodium 126 potassium 3.9 BUN 6 

creatinine 2.86 plasma lactic acid 6.2 total bilirubin 28.9 AST 1963 ALT 83 alk 

phos 142 ammonia 47.  Patient is denying any acute changes through the night.  

Still has some abdominal discomfort and distention.  No shortness of breath or 

chest pain reported.  Nausea has improved.





Objective





- Vital Signs


Vital signs: 


                                   Vital Signs











Temp  98.7 F   05/06/22 08:00


 


Pulse  90   05/06/22 08:00


 


Resp  15   05/06/22 08:00


 


BP  121/68   05/06/22 08:00


 


Pulse Ox  96   05/06/22 08:00








                                 Intake & Output











 05/05/22 05/06/22 05/06/22





 18:59 06:59 18:59


 


Intake Total 1756 1060 40


 


Output Total 90 965 95


 


Balance 1666 95 -55


 


Weight 115.666 kg 126.4 kg 


 


Intake:   


 


   640 40


 


    0.9@20mls/hr  140 40


 


    Sodium Chloride 0.9% 500 500 500 





    ml 500 ml @ 999 mls/hr IV   





    .Q31M ONE Rx#:[id number]   


 


  Intake, IV Titration  200 





  Amount   


 


    Piperacillin-Tazobactam 3  100 





    .375 gm In Sodium   





    Chloride 0.9% 100 ml @ 25   





    mls/hr IVPB Q8H [facility] Rx#:   





    [id number]   


 


    Piperacillin-Tazobactam 3  100 





    .375 gm In Sodium   





    Chloride 0.9% 100 ml @ 25   





    mls/hr IVPB Q8HR [facility] Rx#   





    :[id number]   


 


  Oral  220 


 


  Blood Product 1256  


 


    Ffp 24 Cpd  Unit 325  





    [id number]   


 


    Ffp 24 Cpd  Unit 303  





    [id number]   


 


Output:   


 


  Urine 90 665 95


 


    Straight 25  


 


  Emesis  300 


 


Other:   


 


  Voiding Method Indwelling Catheter Indwelling Catheter 


 


  # Bowel Movements  1 














- Exam





General appearance: The patient is alert, oriented, appears in no acute 

distress.


HET: Head is normocephalic and atraumatic.  Conjunctiva pink.  Sclera deeply 

icteric.


Neck: Supple without lymphadenopathy.  


Abdomen: Soft, tender, distended, ascites.  No guarding or rigidity.


Extremities: Normal skin color and turgor.  Bilateral pedal edema.


Skin: No rashes, jaundice


Neurological: No focal deficits.  Alert and oriented x3.





- Labs


CBC & Chem 7: 


                                 05/06/22 06:54





                                 05/06/22 06:54


Labs: 


                  Abnormal Lab Results - Last 24 Hours (Table)











  05/05/22 05/05/22 05/05/22 Range/Units





  08:46 09:10 09:30 


 


RBC     (4.30-5.90)  m/uL


 


Hgb     (13.0-17.5)  gm/dL


 


Hct     (39.0-53.0)  %


 


MCV     (80.0-100.0)  fL


 


MCH     (25.0-35.0)  pg


 


RDW     (11.5-15.5)  %


 


Plt Count     (150-450)  k/uL


 


Lymphocytes #     (1.0-4.8)  k/uL


 


Monocytes #     (0-1.0)  k/uL


 


Macrocytosis     


 


PT     (9.0-12.0)  sec


 


INR     (<1.2)  


 


Sodium     (137-145)  mmol/L


 


Chloride     ()  mmol/L


 


Carbon Dioxide     (22-30)  mmol/L


 


BUN     (9-20)  mg/dL


 


Creatinine     (0.66-1.25)  mg/dL


 


POC Glucose (mg/dL)  62 L  67 L  61 L  (75-99)  mg/dL


 


Osmolality     (280-301)  mosm/kg


 


Plasma Lactic Acid Evangelista     (0.7-2.0)  mmol/L


 


Calcium     (8.4-10.2)  mg/dL


 


Total Bilirubin     (0.2-1.3)  mg/dL


 


AST     (17-59)  U/L


 


ALT     (4-49)  U/L


 


Alkaline Phosphatase     ()  U/L


 


Ammonia     (<30)  umol/L


 


Total Protein     (6.3-8.2)  g/dL


 


Albumin     (3.5-5.0)  g/dL


 


Ur Random Sodium     ()  mmol/L














  05/05/22 05/05/22 05/05/22 Range/Units





  09:51 10:14 10:34 


 


RBC     (4.30-5.90)  m/uL


 


Hgb     (13.0-17.5)  gm/dL


 


Hct     (39.0-53.0)  %


 


MCV     (80.0-100.0)  fL


 


MCH     (25.0-35.0)  pg


 


RDW     (11.5-15.5)  %


 


Plt Count     (150-450)  k/uL


 


Lymphocytes #     (1.0-4.8)  k/uL


 


Monocytes #     (0-1.0)  k/uL


 


Macrocytosis     


 


PT     (9.0-12.0)  sec


 


INR     (<1.2)  


 


Sodium     (137-145)  mmol/L


 


Chloride     ()  mmol/L


 


Carbon Dioxide     (22-30)  mmol/L


 


BUN     (9-20)  mg/dL


 


Creatinine     (0.66-1.25)  mg/dL


 


POC Glucose (mg/dL)  63 L  67 L  132 H  (75-99)  mg/dL


 


Osmolality     (280-301)  mosm/kg


 


Plasma Lactic Acid Evangelista     (0.7-2.0)  mmol/L


 


Calcium     (8.4-10.2)  mg/dL


 


Total Bilirubin     (0.2-1.3)  mg/dL


 


AST     (17-59)  U/L


 


ALT     (4-49)  U/L


 


Alkaline Phosphatase     ()  U/L


 


Ammonia     (<30)  umol/L


 


Total Protein     (6.3-8.2)  g/dL


 


Albumin     (3.5-5.0)  g/dL


 


Ur Random Sodium     ()  mmol/L














  05/05/22 05/05/22 05/05/22 Range/Units





  10:58 10:58 10:58 


 


RBC    3.30 L  (4.30-5.90)  m/uL


 


Hgb     (13.0-17.5)  gm/dL


 


Hct     (39.0-53.0)  %


 


MCV    123.3 H D  (80.0-100.0)  fL


 


MCH    39.3 H  (25.0-35.0)  pg


 


RDW    18.7 H  (11.5-15.5)  %


 


Plt Count    76 L  (150-450)  k/uL


 


Lymphocytes #    0.4 L  (1.0-4.8)  k/uL


 


Monocytes #    1.1 H  (0-1.0)  k/uL


 


Macrocytosis    Marked A  


 


PT     (9.0-12.0)  sec


 


INR     (<1.2)  


 


Sodium  125 L    (137-145)  mmol/L


 


Chloride  89 L    ()  mmol/L


 


Carbon Dioxide  14 L    (22-30)  mmol/L


 


BUN  4 L    (9-20)  mg/dL


 


Creatinine  2.15 H    (0.66-1.25)  mg/dL


 


POC Glucose (mg/dL)     (75-99)  mg/dL


 


Osmolality     (280-301)  mosm/kg


 


Plasma Lactic Acid Evangelista   13.4 H*   (0.7-2.0)  mmol/L


 


Calcium  8.2 L    (8.4-10.2)  mg/dL


 


Total Bilirubin  29.1 H*    (0.2-1.3)  mg/dL


 


AST  1310 H    (17-59)  U/L


 


ALT  109 H    (4-49)  U/L


 


Alkaline Phosphatase  183 H    ()  U/L


 


Ammonia     (<30)  umol/L


 


Total Protein     (6.3-8.2)  g/dL


 


Albumin  2.6 L    (3.5-5.0)  g/dL


 


Ur Random Sodium     ()  mmol/L














  05/05/22 05/05/22 05/05/22 Range/Units





  11:43 11:56 13:47 


 


RBC     (4.30-5.90)  m/uL


 


Hgb     (13.0-17.5)  gm/dL


 


Hct     (39.0-53.0)  %


 


MCV     (80.0-100.0)  fL


 


MCH     (25.0-35.0)  pg


 


RDW     (11.5-15.5)  %


 


Plt Count     (150-450)  k/uL


 


Lymphocytes #     (1.0-4.8)  k/uL


 


Monocytes #     (0-1.0)  k/uL


 


Macrocytosis     


 


PT   36.6 H   (9.0-12.0)  sec


 


INR   3.7 H   (<1.2)  


 


Sodium     (137-145)  mmol/L


 


Chloride     ()  mmol/L


 


Carbon Dioxide     (22-30)  mmol/L


 


BUN     (9-20)  mg/dL


 


Creatinine     (0.66-1.25)  mg/dL


 


POC Glucose (mg/dL)  116 H   115 H  (75-99)  mg/dL


 


Osmolality     (280-301)  mosm/kg


 


Plasma Lactic Acid Evangelista     (0.7-2.0)  mmol/L


 


Calcium     (8.4-10.2)  mg/dL


 


Total Bilirubin     (0.2-1.3)  mg/dL


 


AST     (17-59)  U/L


 


ALT     (4-49)  U/L


 


Alkaline Phosphatase     ()  U/L


 


Ammonia     (<30)  umol/L


 


Total Protein     (6.3-8.2)  g/dL


 


Albumin     (3.5-5.0)  g/dL


 


Ur Random Sodium     ()  mmol/L














  05/05/22 05/05/22 05/05/22 Range/Units





  14:13 14:21 14:45 


 


RBC     (4.30-5.90)  m/uL


 


Hgb     (13.0-17.5)  gm/dL


 


Hct     (39.0-53.0)  %


 


MCV     (80.0-100.0)  fL


 


MCH     (25.0-35.0)  pg


 


RDW     (11.5-15.5)  %


 


Plt Count     (150-450)  k/uL


 


Lymphocytes #     (1.0-4.8)  k/uL


 


Monocytes #     (0-1.0)  k/uL


 


Macrocytosis     


 


PT     (9.0-12.0)  sec


 


INR     (<1.2)  


 


Sodium     (137-145)  mmol/L


 


Chloride     ()  mmol/L


 


Carbon Dioxide     (22-30)  mmol/L


 


BUN     (9-20)  mg/dL


 


Creatinine     (0.66-1.25)  mg/dL


 


POC Glucose (mg/dL)     (75-99)  mg/dL


 


Osmolality  264 L    (280-301)  mosm/kg


 


Plasma Lactic Acid Evangelista   11.2 H*   (0.7-2.0)  mmol/L


 


Calcium     (8.4-10.2)  mg/dL


 


Total Bilirubin     (0.2-1.3)  mg/dL


 


AST     (17-59)  U/L


 


ALT     (4-49)  U/L


 


Alkaline Phosphatase     ()  U/L


 


Ammonia     (<30)  umol/L


 


Total Protein     (6.3-8.2)  g/dL


 


Albumin     (3.5-5.0)  g/dL


 


Ur Random Sodium    <20 L  ()  mmol/L














  05/05/22 05/05/22 05/06/22 Range/Units





  18:54 21:22 00:00 


 


RBC     (4.30-5.90)  m/uL


 


Hgb     (13.0-17.5)  gm/dL


 


Hct     (39.0-53.0)  %


 


MCV     (80.0-100.0)  fL


 


MCH     (25.0-35.0)  pg


 


RDW     (11.5-15.5)  %


 


Plt Count     (150-450)  k/uL


 


Lymphocytes #     (1.0-4.8)  k/uL


 


Monocytes #     (0-1.0)  k/uL


 


Macrocytosis     


 


PT     (9.0-12.0)  sec


 


INR     (<1.2)  


 


Sodium     (137-145)  mmol/L


 


Chloride     ()  mmol/L


 


Carbon Dioxide     (22-30)  mmol/L


 


BUN     (9-20)  mg/dL


 


Creatinine     (0.66-1.25)  mg/dL


 


POC Glucose (mg/dL)     (75-99)  mg/dL


 


Osmolality     (280-301)  mosm/kg


 


Plasma Lactic Acid Evangelista  8.1 H*  7.3 H*  6.8 H*  (0.7-2.0)  mmol/L


 


Calcium     (8.4-10.2)  mg/dL


 


Total Bilirubin     (0.2-1.3)  mg/dL


 


AST     (17-59)  U/L


 


ALT     (4-49)  U/L


 


Alkaline Phosphatase     ()  U/L


 


Ammonia     (<30)  umol/L


 


Total Protein     (6.3-8.2)  g/dL


 


Albumin     (3.5-5.0)  g/dL


 


Ur Random Sodium     ()  mmol/L














  05/06/22 05/06/22 05/06/22 Range/Units





  04:00 06:54 06:54 


 


RBC    2.92 L  (4.30-5.90)  m/uL


 


Hgb    11.4 L  (13.0-17.5)  gm/dL


 


Hct    34.5 L  (39.0-53.0)  %


 


MCV    118.0 H D  (80.0-100.0)  fL


 


MCH    39.0 H  (25.0-35.0)  pg


 


RDW    17.8 H  (11.5-15.5)  %


 


Plt Count    45 L  (150-450)  k/uL


 


Lymphocytes #     (1.0-4.8)  k/uL


 


Monocytes #     (0-1.0)  k/uL


 


Macrocytosis    Marked A  


 


PT     (9.0-12.0)  sec


 


INR     (<1.2)  


 


Sodium   126 L   (137-145)  mmol/L


 


Chloride   87 L   ()  mmol/L


 


Carbon Dioxide     (22-30)  mmol/L


 


BUN   6 L   (9-20)  mg/dL


 


Creatinine   2.86 H   (0.66-1.25)  mg/dL


 


POC Glucose (mg/dL)     (75-99)  mg/dL


 


Osmolality     (280-301)  mosm/kg


 


Plasma Lactic Acid Evangelista  6.2 H*    (0.7-2.0)  mmol/L


 


Calcium     (8.4-10.2)  mg/dL


 


Total Bilirubin   28.9 H*   (0.2-1.3)  mg/dL


 


AST   963 H   (17-59)  U/L


 


ALT   83 H   (4-49)  U/L


 


Alkaline Phosphatase   142 H   ()  U/L


 


Ammonia     (<30)  umol/L


 


Total Protein   6.1 L   (6.3-8.2)  g/dL


 


Albumin   2.3 L   (3.5-5.0)  g/dL


 


Ur Random Sodium     ()  mmol/L














  05/06/22 05/06/22 Range/Units





  06:54 06:54 


 


RBC    (4.30-5.90)  m/uL


 


Hgb    (13.0-17.5)  gm/dL


 


Hct    (39.0-53.0)  %


 


MCV    (80.0-100.0)  fL


 


MCH    (25.0-35.0)  pg


 


RDW    (11.5-15.5)  %


 


Plt Count    (150-450)  k/uL


 


Lymphocytes #    (1.0-4.8)  k/uL


 


Monocytes #    (0-1.0)  k/uL


 


Macrocytosis    


 


PT   28.3 H  (9.0-12.0)  sec


 


INR   2.8 H  (<1.2)  


 


Sodium    (137-145)  mmol/L


 


Chloride    ()  mmol/L


 


Carbon Dioxide    (22-30)  mmol/L


 


BUN    (9-20)  mg/dL


 


Creatinine    (0.66-1.25)  mg/dL


 


POC Glucose (mg/dL)    (75-99)  mg/dL


 


Osmolality    (280-301)  mosm/kg


 


Plasma Lactic Acid Evangelista    (0.7-2.0)  mmol/L


 


Calcium    (8.4-10.2)  mg/dL


 


Total Bilirubin    (0.2-1.3)  mg/dL


 


AST    (17-59)  U/L


 


ALT    (4-49)  U/L


 


Alkaline Phosphatase    ()  U/L


 


Ammonia  47 H   (<30)  umol/L


 


Total Protein    (6.3-8.2)  g/dL


 


Albumin    (3.5-5.0)  g/dL


 


Ur Random Sodium    ()  mmol/L








                      Microbiology - Last 24 Hours (Table)











 05/05/22 03:15 Urine Culture - Preliminary





 Urine,Voided 














Assessment and Plan


(1) Alcoholic hepatitis


Narrative/Plan: 


This is a 52 year old male who presented with jaundice and noted to have 

elevated bilirubin and LFTS, as well as lactic acid.  Labs are consistent with 

acute alcoholic hepatitis. He has a significant history of alcohol abuse and 

admits to drinking 2-3 tall beers a day for the last 10 years.  He denies any 

new medicaitons or antibiotic use. NO prior history of liver disease.  Avoid 

hepatotoxic medications, continue supportive care.  Will repeat labs tomorrow 

and monitor closely.  May need to consider transfer to tertiary center if no 

improvement.


Current Visit: Yes   Status: Acute   Code(s): K70.10 - ALCOHOLIC HEPATITIS 

WITHOUT ASCITES   SNOMED Code(s): 311275698


   





(2) Decompensated liver disease


Narrative/Plan: 


Patient continues to have elevated ammonia level, total bilirubin LFTs with no 

improvement.  Gastroenterology will not be available in this hospital over the 

next 1 week duration.  Recommend transfer to tertiary center with 

gastroenterologist/hepatology services.


Current Visit: Yes   Status: Acute   Code(s): K74.69 - OTHER CIRRHOSIS OF LIVER 

 SNOMED Code(s): 63915972


   





(3) Ascites


Narrative/Plan: 


Paracentesis when INR stable


Current Visit: Yes   Status: Acute   Code(s): R18.8 - OTHER ASCITES   SNOMED 

Code(s): 220467596


   





(4) Jaundice


Current Visit: Yes   Status: Acute   Code(s): R17 - UNSPECIFIED JAUNDICE   

SNOMED Code(s): 03433739


   





(5) Hyponatremia


Narrative/Plan: 


Nephrology following, will defer diuretics to them.  


Current Visit: Yes   Status: Acute   Code(s): E87.1 - HYPO-OSMOLALITY AND 

HYPONATREMIA   SNOMED Code(s): 15782878


   





(6) Hyperammonemia


Narrative/Plan: 


Continue lactulose 30 g 3 times a day.  If patient does not have bowel movement 

after first dose please repeat in one hour.


Current Visit: Yes   Status: Acute   Code(s): E72.20 - DISORDER OF UREA CYCLE 

METABOLISM, UNSPECIFIED   SNOMED Code(s): 1469747


   


Plan: 





1.  Continue symptomatic and supportive care


2.  Avoid hepatoxic medications


3.  Alcohol abstinence


4.  Daily CBC, CMP, INR, ammonia


5.  Start Lactulose 30 GM TID, give 1 dose now if patient does not have bowel 

movement 1 hour repeat dose.


6.  Low sodium diet


7.  Defer diuretics to nephrology


8.  Recommend transfer to tertiary center with gastroenterologist/hepatology 

services as patient with decompensated liver disease, no available gas

troenterologist in house for the next 1 week duration.  Primary medicine team 

notified.





Thank you for allowing us to participate in the care of the patient, the GI 

service will sign off, gastroenterology will not be available at the hospital 

this weekend and through next week.  Again recommend transfer to tertiary center

with gastroenterology service.











Dr. MARIS Segura


I agree with the dictator's note, documented as a scribe by Sravanthi JAUREGUI.

## 2022-05-06 NOTE — P.DS
Providers


Date of admission: 


05/04/22 18:37





Expected date of discharge: 05/06/22


Attending physician: 


Emerald Huffman





Consults: 





                                        





05/04/22 22:14


Consult Physician Urgent 


   Consulting Provider: Keyla Rivera


   Consult Reason/Comments: Acute appendicitis


   Do you want consulting provider notified?: Yes





05/05/22 08:50


Consult Physician Urgent 


   Consulting Provider: Delonte Gil


   Consult Reason/Comments: arf, hyponatremia, liver disease


   Do you want consulting provider notified?: Yes


Consult Physician Urgent 


   Consulting Provider: Ashwini Segura


   Consult Reason/Comments: liver failure, ascites, bili 28


   Do you want consulting provider notified?: Yes





05/05/22 12:26


Consult Physician Routine 


   Consulting Provider: Raul Ku


   Consult Reason/Comments: ascites, liver failure


   Do you want consulting provider notified?: Yes











Primary care physician: 


Denys Schulerhven





[facility] Course: 











Final diagnosis





Acute hepatic failure secondary to alcoholic hepatitis and cirrhosis of the l

iver.


Acute kidney injury secondary to acute tubular necrosis with concern for pigment

nephropathy given his elevated bilirubin and also hepatorenal syndrome


Severe hyperbilirubinemia


Hypomagnesemia


Hyponatremia


Benign hypertension


Possible peripheral neuropathy.


Ascites


Gait dysfunction.


History of chronic EtOH


Hepatic encephalopathy


history of chronic abdominal aortic dissection.


Morbid Obesity BMI of 43.6


Full code








Discharge disposition


Patient is being transferred in a stable condition with guarded prognosis to 

[facility] and was accepted by hospitalist, Dr. Melo and will be consulted and GI along with nephrology.  Patient will follow-

up with Dr. Calle in the outpatient setting upon discharge.  Total time taken is 

greater than 35 minutes.





Hospital course





This is a 52-year-old male who was recently admitted with generalized weakness, 

abdominal pain, and jaundice with possible alcoholic hepatitis and was being 

closely monitored.  Patient was also found to have an elevated ammonia level and

significant ascites and patient is severely jaundiced.  On admission patient 

lactic acid was elevated at 13.4 and bilirubin was 29.1.  Patient also found to 

be hyponatremic and continues to have worsening kidney functions as creatinine 

is currently 2.86.  Patient lactic acid continues to be elevated and bilirubin 

today is 28.9, LFTs elevated and ammonia remains 47.  Patient is alert and 

oriented 3.  Covid testing was negative.  Patient's INR elevated as well and 

currently 2.8.  Interventional radiology was consulted for possible 

paracentesis, although INR continues to be elevated and this was discussed with 

Dr. Melo from [facility] as well.  Patient has been accepted and has 

received a bed assignment and is awaiting transportation.


Currently no reports of chest pain, shortness of breath, or palpitations.  

Patient is afebrile.  No reports of nausea or vomiting and patient is tolerating

diet.  Patient will be transferred to [facility] today .  Extremely guarded 

prognosis.





Physical exam:





Gen: This is a 52-year-old male who is alert and oriented 3, morbidly obese, 

well-developed, well-nourished.  Temp is 98.3F, pulse is 87, respirations are 

16, blood pressure is 105/69, oxygen saturation is 93% on room air.


HEENT: Head is atraumatic, normocephalic. Pupils equal, round. Sclerae is 

icteric.  Patient is jaundiced


NECK: Supple. No JVD. No lymphadenopathy. No thyromegaly. 


LUNGS: Diminished breath sounds bilaterally with some scattered rhonchi.  No 

intercostal retractions.


HEART: Regular rate and rhythm. No murmur. 


ABDOMEN: Soft.  Obese, tympanic, distended Bowel sounds are present. No masses. 

 tenderness.


EXTREMITIES: No pedal edema.  No calf tenderness.


NEUROLOGICAL: Patient is awake, alert and oriented x3. Cranial nerves 2 through 

12 are grossly intact.  Diffusely weak





Please refer to medication reconciliation sheet for a list of medications.








The impression and plan of care has been dictated by Veronica Lira, Nurse 

Practitioner as directed.





Dr. Nathanael MD


I have performed a history and examination and MDM of this patient, discussed 

the same with the dictator, and  agree with the dictator's assessment and plan 

as written ,documented as a scribe. Based on total visit time,  I have performed

more than 50% of the visit.  


Patient Condition at Discharge: Fair





Plan - Discharge Summary


Discharge Rx Participant: Yes


New Discharge Prescriptions: 


No Action


   cloNIDine HCL [Catapres] 0.3 mg PO TID


   Loperamide HCl [Imodium A-D] 2 - 4 mg PO QID PRN


     PRN Reason: Diarrhea


   Furosemide [Lasix] 20 mg PO DAILY


   Aspirin EC [Ecotrin Low Dose] 81 mg PO DAILY


   Doxazosin [Cardura] 4 mg PO DAILY #30 tab


   Cyclobenzaprine [Flexeril] 10 mg PO HS #30 tab


   Budesonide/Formoterol Fumarate [Symbicort 160-4.5 Mcg Inhaler] 2 puff 

INHALATION RT-BID


   Albuterol Inhaler [Ventolin Hfa Inhaler] 2 puff INHALATION RT-QID PRN


     PRN Reason: Shortness Of Breath


   lisinopriL [Zestril] 20 mg PO DAILY


   Labetalol HCl [Trandate] 300 mg PO DAILY


   Ergocalciferol (Vitamin D2) [Drisdol (50,000 Iu)] 1,250 mcg PO Q7D


Discharge Medication List





Aspirin EC [Ecotrin Low Dose] 81 mg PO DAILY 12/18/21 [History]


Doxazosin [Cardura] 4 mg PO DAILY #30 tab 12/20/21 [Rx]


Cyclobenzaprine [Flexeril] 10 mg PO HS #30 tab 03/27/22 [Rx]


Albuterol Inhaler [Ventolin Hfa Inhaler] 2 puff INHALATION RT-QID PRN 05/04/22 

[History]


Budesonide/Formoterol Fumarate [Symbicort 160-4.5 Mcg Inhaler] 2 puff INHALATION

RT-BID 05/04/22 [History]


Ergocalciferol (Vitamin D2) [Drisdol (50,000 Iu)] 1,250 mcg PO Q7D 05/04/22 

[History]


Furosemide [Lasix] 20 mg PO DAILY 05/04/22 [History]


Labetalol HCl [Trandate] 300 mg PO DAILY 05/04/22 [History]


Loperamide HCl [Imodium A-D] 2 - 4 mg PO QID PRN 05/04/22 [History]


cloNIDine HCL [Catapres] 0.3 mg PO TID 05/04/22 [History]


lisinopriL [Zestril] 20 mg PO DAILY 05/04/22 [History]








Follow up Appointment(s)/Referral(s): 


Denys Long MD [Primary Care Provider] - 1-2 days

## 2022-05-06 NOTE — P.PN
Subjective


Progress Note Date: 05/06/22


Principal diagnosis: 





Acute hepatic failure secondary to alcoholic hepatitis. 





This is a 52-year-old white male with history of alcoholism, chronic dissection 

of abdominal aortic aneurysm, history of hypertension, patient was brought in 

yesterday with multiple complaints including abdominal distention, weakness in 

the legs, and turning kilo/jaundice.  Patient was evaluated in the ER, and he 

was found to have significant abnormal labs, and significant ascites/anasarca.  

Patient was also found to have significant abnormal liver enzymes, elevated INR 

of 3.7, hyponatremia with a sodium of 125, anion gap metabolic acidosis with 

bicarb of 14, elevated lactic acid, and acute kidney injury with a creatinine of

2.15.  Patient was also noted to be jaundiced, his total bilirubin was 29.1.  

AST was 1310  and alkaline phosphatase 183.  Obviously the patient has 

multiple organ failure, and he is yet to undergo a paracentesis by 

interventional radiology, considering his complex medical issues, I was asked to

see him and possibly transferred to the ICU.  I did arrange for the patient to 

transfer to the ICU, will start the patient on antibiotics for his ascites.  

Will recommend GI evaluation, and he is also being seen by nephrology.  Patient 

is not a great historian but he was told by family members that he is turning 

yellow only a few days ago.  Patient has been complaining of increased abdominal

girth for the last a few weeks, and has been generally weak and feeling numb in 

his lower extremities.  Denies any cough denies any fever no chills, denies any 

shortness of breath.  CT of the abdomen and pelvis clearly showed ascites, and 

it also showed appendix to be somewhat dilated the radiologist raised the 

possibility of acute appendicitis.  There was evidence of moderate diffuse fatty

infiltration of the liver there was also evidence of abdominal aortic aneurysm 

with dissection the same compared to previous CT of the abdomen back in 2021.  

Obviously the patient has chronic aortic dissection.





Reevaluated today on 5/6/22, patient remains in the ICU, I transferred the 

patient to the ICU yesterday with acute hepatic failure, ascites, acute kidney 

injury, worsening jaundice, coagulopathy secondary to liver disease, 

hyperammonemia level, and thrombocytopenia secondary to alcoholic liver disease 

patient had elevated INR, and we consulted interventional radiology for 

paracentesis, I did give the patient yesterday vitamin K and fresh frozen 

plasma, and I was able to establish a right femoral triple-lumen catheter, 

interventional radiology is still reluctant to perform paracentesis on this 

patient because of his coagulopathy.  Patient was seen by gastroenterology on 

consultation, recommendations were made, however we have no gastroenterology 

coverage over the weekend, and plans are being made to transfer the patient to 

[facility], but as usual meds are available and the patient may not

be able to be transferred easily to any institution.  In the meantime, the 

patient is receiving supportive care measures.  But he will definitely need 

paracentesis, and he is being followed by many consultants including 

gastroenterology, nephrology, general surgery, pulmonary-wise, the patient is 

asymptomatic, he is on 2 L nasal cannula, and O2 saturations 95%.











Objective





- Vital Signs


Vital signs: 


                                   Vital Signs











Temp  98.7 F   05/06/22 08:00


 


Pulse  84   05/06/22 11:00


 


Resp  12   05/06/22 11:00


 


BP  101/62   05/06/22 11:00


 


Pulse Ox  95   05/06/22 11:00








                                 Intake & Output











 05/05/22 05/06/22 05/06/22





 18:59 06:59 18:59


 


Intake Total 1756 1060 100


 


Output Total 90 965 175


 


Balance 1666 95 -75


 


Weight 115.666 kg 126.4 kg 


 


Intake:   


 


   640 100


 


    0.9@20mls/hr  140 100


 


    Sodium Chloride 0.9% 500 500 500 





    ml 500 ml @ 999 mls/hr IV   





    .Q31M ONE Rx#:[id number]   


 


  Intake, IV Titration  200 





  Amount   


 


    Piperacillin-Tazobactam 3  100 





    .375 gm In Sodium   





    Chloride 0.9% 100 ml @ 25   





    mls/hr IVPB Q8H [facility] Rx#:   





    [id number]   


 


    Piperacillin-Tazobactam 3  100 





    .375 gm In Sodium   





    Chloride 0.9% 100 ml @ 25   





    mls/hr IVPB Q8HR [facility] Rx#   





    [id number]   


 


  Oral  220 


 


  Blood Product 1256  


 


    Ffp 24 Cpd  Unit 325  





    [id number]   


 


    Ffp 24 Cpd  Unit 303  





    [id number]   


 


Output:   


 


  Urine 90 665 175


 


    Straight 25  


 


  Emesis  300 


 


Other:   


 


  Voiding Method Indwelling Catheter Indwelling Catheter Indwelling Catheter


 


  # Bowel Movements  1 














- Exam


Physical Exam: Revealed a 52-year-old white male, looks jaundiced, however he is

not in respiratory distress.  On few liters nasal cannula.


Head: Atraumatic, normocephalic.


HEENT:[Neck is supple.] [No neck masses.] [No thyromegaly.] [No JVD.]


Chest: [Clear throughout, no crackles, no rhonchi, no wheezes.]


Cardiac Exam: [Normal S1 and S2, no S3 gallop, no murmur.]


Abdomen: Obese, positive ascites, nontender, no rebound, no guarding.


Extremities: 2+ bipedal edema, no clubbing, no cyanosis.]


Neurological Exam: Alert and oriented 3 focal neurologic deficits. 


Psychiatric: Normal mood affect and normal mental status examination.


Skin patient is jaundiced otherwise unremarkable.











- Labs


CBC & Chem 7: 


                                 05/06/22 06:54





                                 05/06/22 06:54


Labs: 


                  Abnormal Lab Results - Last 24 Hours (Table)











  05/05/22 05/05/22 05/05/22 Range/Units





  10:58 10:58 10:58 


 


RBC    3.30 L  (4.30-5.90)  m/uL


 


Hgb     (13.0-17.5)  gm/dL


 


Hct     (39.0-53.0)  %


 


MCV    123.3 H D  (80.0-100.0)  fL


 


MCH    39.3 H  (25.0-35.0)  pg


 


RDW    18.7 H  (11.5-15.5)  %


 


Plt Count    76 L  (150-450)  k/uL


 


Lymphocytes #    0.4 L  (1.0-4.8)  k/uL


 


Monocytes #    1.1 H  (0-1.0)  k/uL


 


Macrocytosis    Marked A  


 


PT     (9.0-12.0)  sec


 


INR     (<1.2)  


 


Sodium  125 L    (137-145)  mmol/L


 


Chloride  89 L    ()  mmol/L


 


Carbon Dioxide  14 L    (22-30)  mmol/L


 


BUN  4 L    (9-20)  mg/dL


 


Creatinine  2.15 H    (0.66-1.25)  mg/dL


 


POC Glucose (mg/dL)     (75-99)  mg/dL


 


Osmolality     (280-301)  mosm/kg


 


Plasma Lactic Acid Evangelista   13.4 H*   (0.7-2.0)  mmol/L


 


Calcium  8.2 L    (8.4-10.2)  mg/dL


 


Total Bilirubin  29.1 H*    (0.2-1.3)  mg/dL


 


AST  1310 H    (17-59)  U/L


 


ALT  109 H    (4-49)  U/L


 


Alkaline Phosphatase  183 H    ()  U/L


 


Ammonia     (<30)  umol/L


 


Total Protein     (6.3-8.2)  g/dL


 


Albumin  2.6 L    (3.5-5.0)  g/dL


 


Ur Random Sodium     ()  mmol/L














  05/05/22 05/05/22 05/05/22 Range/Units





  11:43 11:56 13:47 


 


RBC     (4.30-5.90)  m/uL


 


Hgb     (13.0-17.5)  gm/dL


 


Hct     (39.0-53.0)  %


 


MCV     (80.0-100.0)  fL


 


MCH     (25.0-35.0)  pg


 


RDW     (11.5-15.5)  %


 


Plt Count     (150-450)  k/uL


 


Lymphocytes #     (1.0-4.8)  k/uL


 


Monocytes #     (0-1.0)  k/uL


 


Macrocytosis     


 


PT   36.6 H   (9.0-12.0)  sec


 


INR   3.7 H   (<1.2)  


 


Sodium     (137-145)  mmol/L


 


Chloride     ()  mmol/L


 


Carbon Dioxide     (22-30)  mmol/L


 


BUN     (9-20)  mg/dL


 


Creatinine     (0.66-1.25)  mg/dL


 


POC Glucose (mg/dL)  116 H   115 H  (75-99)  mg/dL


 


Osmolality     (280-301)  mosm/kg


 


Plasma Lactic Acid Evangelista     (0.7-2.0)  mmol/L


 


Calcium     (8.4-10.2)  mg/dL


 


Total Bilirubin     (0.2-1.3)  mg/dL


 


AST     (17-59)  U/L


 


ALT     (4-49)  U/L


 


Alkaline Phosphatase     ()  U/L


 


Ammonia     (<30)  umol/L


 


Total Protein     (6.3-8.2)  g/dL


 


Albumin     (3.5-5.0)  g/dL


 


Ur Random Sodium     ()  mmol/L














  05/05/22 05/05/22 05/05/22 Range/Units





  14:13 14:21 14:45 


 


RBC     (4.30-5.90)  m/uL


 


Hgb     (13.0-17.5)  gm/dL


 


Hct     (39.0-53.0)  %


 


MCV     (80.0-100.0)  fL


 


MCH     (25.0-35.0)  pg


 


RDW     (11.5-15.5)  %


 


Plt Count     (150-450)  k/uL


 


Lymphocytes #     (1.0-4.8)  k/uL


 


Monocytes #     (0-1.0)  k/uL


 


Macrocytosis     


 


PT     (9.0-12.0)  sec


 


INR     (<1.2)  


 


Sodium     (137-145)  mmol/L


 


Chloride     ()  mmol/L


 


Carbon Dioxide     (22-30)  mmol/L


 


BUN     (9-20)  mg/dL


 


Creatinine     (0.66-1.25)  mg/dL


 


POC Glucose (mg/dL)     (75-99)  mg/dL


 


Osmolality  264 L    (280-301)  mosm/kg


 


Plasma Lactic Acid Evangelista   11.2 H*   (0.7-2.0)  mmol/L


 


Calcium     (8.4-10.2)  mg/dL


 


Total Bilirubin     (0.2-1.3)  mg/dL


 


AST     (17-59)  U/L


 


ALT     (4-49)  U/L


 


Alkaline Phosphatase     ()  U/L


 


Ammonia     (<30)  umol/L


 


Total Protein     (6.3-8.2)  g/dL


 


Albumin     (3.5-5.0)  g/dL


 


Ur Random Sodium    <20 L  ()  mmol/L














  05/05/22 05/05/22 05/06/22 Range/Units





  18:54 21:22 00:00 


 


RBC     (4.30-5.90)  m/uL


 


Hgb     (13.0-17.5)  gm/dL


 


Hct     (39.0-53.0)  %


 


MCV     (80.0-100.0)  fL


 


MCH     (25.0-35.0)  pg


 


RDW     (11.5-15.5)  %


 


Plt Count     (150-450)  k/uL


 


Lymphocytes #     (1.0-4.8)  k/uL


 


Monocytes #     (0-1.0)  k/uL


 


Macrocytosis     


 


PT     (9.0-12.0)  sec


 


INR     (<1.2)  


 


Sodium     (137-145)  mmol/L


 


Chloride     ()  mmol/L


 


Carbon Dioxide     (22-30)  mmol/L


 


BUN     (9-20)  mg/dL


 


Creatinine     (0.66-1.25)  mg/dL


 


POC Glucose (mg/dL)     (75-99)  mg/dL


 


Osmolality     (280-301)  mosm/kg


 


Plasma Lactic Acid Evangelista  8.1 H*  7.3 H*  6.8 H*  (0.7-2.0)  mmol/L


 


Calcium     (8.4-10.2)  mg/dL


 


Total Bilirubin     (0.2-1.3)  mg/dL


 


AST     (17-59)  U/L


 


ALT     (4-49)  U/L


 


Alkaline Phosphatase     ()  U/L


 


Ammonia     (<30)  umol/L


 


Total Protein     (6.3-8.2)  g/dL


 


Albumin     (3.5-5.0)  g/dL


 


Ur Random Sodium     ()  mmol/L














  05/06/22 05/06/22 05/06/22 Range/Units





  04:00 06:54 06:54 


 


RBC    2.92 L  (4.30-5.90)  m/uL


 


Hgb    11.4 L  (13.0-17.5)  gm/dL


 


Hct    34.5 L  (39.0-53.0)  %


 


MCV    118.0 H D  (80.0-100.0)  fL


 


MCH    39.0 H  (25.0-35.0)  pg


 


RDW    17.8 H  (11.5-15.5)  %


 


Plt Count    45 L  (150-450)  k/uL


 


Lymphocytes #     (1.0-4.8)  k/uL


 


Monocytes #     (0-1.0)  k/uL


 


Macrocytosis    Marked A  


 


PT     (9.0-12.0)  sec


 


INR     (<1.2)  


 


Sodium   126 L   (137-145)  mmol/L


 


Chloride   87 L   ()  mmol/L


 


Carbon Dioxide     (22-30)  mmol/L


 


BUN   6 L   (9-20)  mg/dL


 


Creatinine   2.86 H   (0.66-1.25)  mg/dL


 


POC Glucose (mg/dL)     (75-99)  mg/dL


 


Osmolality     (280-301)  mosm/kg


 


Plasma Lactic Acid Evangelista  6.2 H*    (0.7-2.0)  mmol/L


 


Calcium     (8.4-10.2)  mg/dL


 


Total Bilirubin   28.9 H*   (0.2-1.3)  mg/dL


 


AST   963 H   (17-59)  U/L


 


ALT   83 H   (4-49)  U/L


 


Alkaline Phosphatase   142 H   ()  U/L


 


Ammonia     (<30)  umol/L


 


Total Protein   6.1 L   (6.3-8.2)  g/dL


 


Albumin   2.3 L   (3.5-5.0)  g/dL


 


Ur Random Sodium     ()  mmol/L














  05/06/22 05/06/22 05/06/22 Range/Units





  06:54 06:54 06:54 


 


RBC     (4.30-5.90)  m/uL


 


Hgb     (13.0-17.5)  gm/dL


 


Hct     (39.0-53.0)  %


 


MCV     (80.0-100.0)  fL


 


MCH     (25.0-35.0)  pg


 


RDW     (11.5-15.5)  %


 


Plt Count     (150-450)  k/uL


 


Lymphocytes #     (1.0-4.8)  k/uL


 


Monocytes #     (0-1.0)  k/uL


 


Macrocytosis     


 


PT   28.3 H   (9.0-12.0)  sec


 


INR   2.8 H   (<1.2)  


 


Sodium     (137-145)  mmol/L


 


Chloride     ()  mmol/L


 


Carbon Dioxide     (22-30)  mmol/L


 


BUN     (9-20)  mg/dL


 


Creatinine     (0.66-1.25)  mg/dL


 


POC Glucose (mg/dL)     (75-99)  mg/dL


 


Osmolality     (280-301)  mosm/kg


 


Plasma Lactic Acid Evangelista    5.4 H*  (0.7-2.0)  mmol/L


 


Calcium     (8.4-10.2)  mg/dL


 


Total Bilirubin     (0.2-1.3)  mg/dL


 


AST     (17-59)  U/L


 


ALT     (4-49)  U/L


 


Alkaline Phosphatase     ()  U/L


 


Ammonia  47 H    (<30)  umol/L


 


Total Protein     (6.3-8.2)  g/dL


 


Albumin     (3.5-5.0)  g/dL


 


Ur Random Sodium     ()  mmol/L








                      Microbiology - Last 24 Hours (Table)











 05/05/22 03:15 Urine Culture - Preliminary





 Urine,Voided 














Assessment and Plan


Assessment: 





Impression:


Acute hepatic failure secondary to alcoholic hepatitis


Acute jaundice secondary to alcoholic liver disease


Severe hyperbilirubinemia secondary to above


Ascites secondary to above


History of alcoholism.


Benign essential hypertension.


Obesity.


Chronic abdominal aortic dissection.


Hyponatremia, likely secondary to fluid overload/hypervolemic hyponatremia or 

could be secondary to excessive drinking/alcoholism.


Acute kidney injury, no hydronephrosis on CT of the abdomen , bladder pressures 

were measured yesterday at 18.


Acute metabolic acidosis secondary to acute kidney injury, being followed by 

nephrology.


Hypomagnesemia secondary to poor intake





Recommendation:


Central line was placed in this patient yesterday as there was no venous access.


Patient remains in the ICU, and he is being considered for transfer to [facility].


Will reconsult interventional radiology and hopefully could proceed with 

paracentesis on this patient after 2 units of fresh frozen plasma given today.


Continue fluid restriction at 1500 mL a day


Bladder pressures were monitored yesterday and they seem to be reasonable.


Hold all nephrotoxic drugs


Continue Zosyn empirically since the patient presented with ascites.


Continue to monitor in the ICU.


Overall prognosis is poor and guarded.  Critical care time is over 30 minutes


Critical care time is over 30 


We'll continue to follow.





Time with Patient: Greater than 30

## 2022-05-06 NOTE — P.PN
Subjective





Patient is seen in follow-up for acute kidney injury.  Renal function worse.  

Currently off IV fluids.  Urine output 40-50 mL an hour.  Has a Pierre catheter. 

Bladder pressure 13.  Oral intake poor.





Vital signs stable.


Gen.: Awake.


HEENT: Head exam is unremarkable. 


LUNGS: Breath sounds decreased.


HEART: Rate and Rhythm are regular.


ABDOMEN: Distention noted.


EXTREMITITES: 1+ edema.





Objective





- Vital Signs


Vital signs: 


                                   Vital Signs











Temp  98.7 F   05/06/22 08:00


 


Pulse  97   05/06/22 09:00


 


Resp  21   05/06/22 09:00


 


BP  115/68   05/06/22 09:00


 


Pulse Ox  95   05/06/22 09:00








                                 Intake & Output











 05/05/22 05/06/22 05/06/22





 18:59 06:59 18:59


 


Intake Total 1756 1060 60


 


Output Total 90 965 135


 


Balance 1666 95 -75


 


Weight 115.666 kg 126.4 kg 


 


Intake:   


 


   640 60


 


    0.9@20mls/hr  140 60


 


    Sodium Chloride 0.9% 500 500 500 





    ml 500 ml @ 999 mls/hr IV   





    .Q31M ONE Rx#:[id number]   


 


  Intake, IV Titration  200 





  Amount   


 


    Piperacillin-Tazobactam 3  100 





    .375 gm In Sodium   





    Chloride 0.9% 100 ml @ 25   





    mls/hr IVPB Q8H [facility] Rx#:   





    [id number]   


 


    Piperacillin-Tazobactam 3  100 





    .375 gm In Sodium   





    Chloride 0.9% 100 ml @ 25   





    mls/hr IVPB Q8HR [facility] Rx#   





    [id number]   


 


  Oral  220 


 


  Blood Product 1256  


 


    Ffp 24 Cpd  Unit 325  





    [id number]   


 


    Ffp 24 Cpd  Unit 303  





    [id number]   


 


Output:   


 


  Urine 90 665 135


 


    Straight 25  


 


  Emesis  300 


 


Other:   


 


  Voiding Method Indwelling Catheter Indwelling Catheter Indwelling Catheter


 


  # Bowel Movements  1 














- Labs


CBC & Chem 7: 


                                 05/06/22 06:54





                                 05/06/22 06:54


Labs: 


                  Abnormal Lab Results - Last 24 Hours (Table)











  05/05/22 05/05/22 05/05/22 Range/Units





  10:34 10:58 10:58 


 


RBC     (4.30-5.90)  m/uL


 


Hgb     (13.0-17.5)  gm/dL


 


Hct     (39.0-53.0)  %


 


MCV     (80.0-100.0)  fL


 


MCH     (25.0-35.0)  pg


 


RDW     (11.5-15.5)  %


 


Plt Count     (150-450)  k/uL


 


Lymphocytes #     (1.0-4.8)  k/uL


 


Monocytes #     (0-1.0)  k/uL


 


Macrocytosis     


 


PT     (9.0-12.0)  sec


 


INR     (<1.2)  


 


Sodium   125 L   (137-145)  mmol/L


 


Chloride   89 L   ()  mmol/L


 


Carbon Dioxide   14 L   (22-30)  mmol/L


 


BUN   4 L   (9-20)  mg/dL


 


Creatinine   2.15 H   (0.66-1.25)  mg/dL


 


POC Glucose (mg/dL)  132 H    (75-99)  mg/dL


 


Osmolality     (280-301)  mosm/kg


 


Plasma Lactic Acid Evangelista    13.4 H*  (0.7-2.0)  mmol/L


 


Calcium   8.2 L   (8.4-10.2)  mg/dL


 


Total Bilirubin   29.1 H*   (0.2-1.3)  mg/dL


 


AST   1310 H   (17-59)  U/L


 


ALT   109 H   (4-49)  U/L


 


Alkaline Phosphatase   183 H   ()  U/L


 


Ammonia     (<30)  umol/L


 


Total Protein     (6.3-8.2)  g/dL


 


Albumin   2.6 L   (3.5-5.0)  g/dL


 


Ur Random Sodium     ()  mmol/L














  05/05/22 05/05/22 05/05/22 Range/Units





  10:58 11:43 11:56 


 


RBC  3.30 L    (4.30-5.90)  m/uL


 


Hgb     (13.0-17.5)  gm/dL


 


Hct     (39.0-53.0)  %


 


MCV  123.3 H D    (80.0-100.0)  fL


 


MCH  39.3 H    (25.0-35.0)  pg


 


RDW  18.7 H    (11.5-15.5)  %


 


Plt Count  76 L    (150-450)  k/uL


 


Lymphocytes #  0.4 L    (1.0-4.8)  k/uL


 


Monocytes #  1.1 H    (0-1.0)  k/uL


 


Macrocytosis  Marked A    


 


PT    36.6 H  (9.0-12.0)  sec


 


INR    3.7 H  (<1.2)  


 


Sodium     (137-145)  mmol/L


 


Chloride     ()  mmol/L


 


Carbon Dioxide     (22-30)  mmol/L


 


BUN     (9-20)  mg/dL


 


Creatinine     (0.66-1.25)  mg/dL


 


POC Glucose (mg/dL)   116 H   (75-99)  mg/dL


 


Osmolality     (280-301)  mosm/kg


 


Plasma Lactic Acid Evangelista     (0.7-2.0)  mmol/L


 


Calcium     (8.4-10.2)  mg/dL


 


Total Bilirubin     (0.2-1.3)  mg/dL


 


AST     (17-59)  U/L


 


ALT     (4-49)  U/L


 


Alkaline Phosphatase     ()  U/L


 


Ammonia     (<30)  umol/L


 


Total Protein     (6.3-8.2)  g/dL


 


Albumin     (3.5-5.0)  g/dL


 


Ur Random Sodium     ()  mmol/L














  05/05/22 05/05/22 05/05/22 Range/Units





  13:47 14:13 14:21 


 


RBC     (4.30-5.90)  m/uL


 


Hgb     (13.0-17.5)  gm/dL


 


Hct     (39.0-53.0)  %


 


MCV     (80.0-100.0)  fL


 


MCH     (25.0-35.0)  pg


 


RDW     (11.5-15.5)  %


 


Plt Count     (150-450)  k/uL


 


Lymphocytes #     (1.0-4.8)  k/uL


 


Monocytes #     (0-1.0)  k/uL


 


Macrocytosis     


 


PT     (9.0-12.0)  sec


 


INR     (<1.2)  


 


Sodium     (137-145)  mmol/L


 


Chloride     ()  mmol/L


 


Carbon Dioxide     (22-30)  mmol/L


 


BUN     (9-20)  mg/dL


 


Creatinine     (0.66-1.25)  mg/dL


 


POC Glucose (mg/dL)  115 H    (75-99)  mg/dL


 


Osmolality   264 L   (280-301)  mosm/kg


 


Plasma Lactic Acid Evangelista    11.2 H*  (0.7-2.0)  mmol/L


 


Calcium     (8.4-10.2)  mg/dL


 


Total Bilirubin     (0.2-1.3)  mg/dL


 


AST     (17-59)  U/L


 


ALT     (4-49)  U/L


 


Alkaline Phosphatase     ()  U/L


 


Ammonia     (<30)  umol/L


 


Total Protein     (6.3-8.2)  g/dL


 


Albumin     (3.5-5.0)  g/dL


 


Ur Random Sodium     ()  mmol/L














  05/05/22 05/05/22 05/05/22 Range/Units





  14:45 18:54 21:22 


 


RBC     (4.30-5.90)  m/uL


 


Hgb     (13.0-17.5)  gm/dL


 


Hct     (39.0-53.0)  %


 


MCV     (80.0-100.0)  fL


 


MCH     (25.0-35.0)  pg


 


RDW     (11.5-15.5)  %


 


Plt Count     (150-450)  k/uL


 


Lymphocytes #     (1.0-4.8)  k/uL


 


Monocytes #     (0-1.0)  k/uL


 


Macrocytosis     


 


PT     (9.0-12.0)  sec


 


INR     (<1.2)  


 


Sodium     (137-145)  mmol/L


 


Chloride     ()  mmol/L


 


Carbon Dioxide     (22-30)  mmol/L


 


BUN     (9-20)  mg/dL


 


Creatinine     (0.66-1.25)  mg/dL


 


POC Glucose (mg/dL)     (75-99)  mg/dL


 


Osmolality     (280-301)  mosm/kg


 


Plasma Lactic Acid Evangelista   8.1 H*  7.3 H*  (0.7-2.0)  mmol/L


 


Calcium     (8.4-10.2)  mg/dL


 


Total Bilirubin     (0.2-1.3)  mg/dL


 


AST     (17-59)  U/L


 


ALT     (4-49)  U/L


 


Alkaline Phosphatase     ()  U/L


 


Ammonia     (<30)  umol/L


 


Total Protein     (6.3-8.2)  g/dL


 


Albumin     (3.5-5.0)  g/dL


 


Ur Random Sodium  <20 L    ()  mmol/L














  05/06/22 05/06/22 05/06/22 Range/Units





  00:00 04:00 06:54 


 


RBC     (4.30-5.90)  m/uL


 


Hgb     (13.0-17.5)  gm/dL


 


Hct     (39.0-53.0)  %


 


MCV     (80.0-100.0)  fL


 


MCH     (25.0-35.0)  pg


 


RDW     (11.5-15.5)  %


 


Plt Count     (150-450)  k/uL


 


Lymphocytes #     (1.0-4.8)  k/uL


 


Monocytes #     (0-1.0)  k/uL


 


Macrocytosis     


 


PT     (9.0-12.0)  sec


 


INR     (<1.2)  


 


Sodium    126 L  (137-145)  mmol/L


 


Chloride    87 L  ()  mmol/L


 


Carbon Dioxide     (22-30)  mmol/L


 


BUN    6 L  (9-20)  mg/dL


 


Creatinine    2.86 H  (0.66-1.25)  mg/dL


 


POC Glucose (mg/dL)     (75-99)  mg/dL


 


Osmolality     (280-301)  mosm/kg


 


Plasma Lactic Acid Evangelista  6.8 H*  6.2 H*   (0.7-2.0)  mmol/L


 


Calcium     (8.4-10.2)  mg/dL


 


Total Bilirubin    28.9 H*  (0.2-1.3)  mg/dL


 


AST    963 H  (17-59)  U/L


 


ALT    83 H  (4-49)  U/L


 


Alkaline Phosphatase    142 H  ()  U/L


 


Ammonia     (<30)  umol/L


 


Total Protein    6.1 L  (6.3-8.2)  g/dL


 


Albumin    2.3 L  (3.5-5.0)  g/dL


 


Ur Random Sodium     ()  mmol/L














  05/06/22 05/06/22 05/06/22 Range/Units





  06:54 06:54 06:54 


 


RBC  2.92 L    (4.30-5.90)  m/uL


 


Hgb  11.4 L    (13.0-17.5)  gm/dL


 


Hct  34.5 L    (39.0-53.0)  %


 


MCV  118.0 H D    (80.0-100.0)  fL


 


MCH  39.0 H    (25.0-35.0)  pg


 


RDW  17.8 H    (11.5-15.5)  %


 


Plt Count  45 L    (150-450)  k/uL


 


Lymphocytes #     (1.0-4.8)  k/uL


 


Monocytes #     (0-1.0)  k/uL


 


Macrocytosis  Marked A    


 


PT    28.3 H  (9.0-12.0)  sec


 


INR    2.8 H  (<1.2)  


 


Sodium     (137-145)  mmol/L


 


Chloride     ()  mmol/L


 


Carbon Dioxide     (22-30)  mmol/L


 


BUN     (9-20)  mg/dL


 


Creatinine     (0.66-1.25)  mg/dL


 


POC Glucose (mg/dL)     (75-99)  mg/dL


 


Osmolality     (280-301)  mosm/kg


 


Plasma Lactic Acid Evangelista     (0.7-2.0)  mmol/L


 


Calcium     (8.4-10.2)  mg/dL


 


Total Bilirubin     (0.2-1.3)  mg/dL


 


AST     (17-59)  U/L


 


ALT     (4-49)  U/L


 


Alkaline Phosphatase     ()  U/L


 


Ammonia   47 H   (<30)  umol/L


 


Total Protein     (6.3-8.2)  g/dL


 


Albumin     (3.5-5.0)  g/dL


 


Ur Random Sodium     ()  mmol/L














  05/06/22 Range/Units





  06:54 


 


RBC   (4.30-5.90)  m/uL


 


Hgb   (13.0-17.5)  gm/dL


 


Hct   (39.0-53.0)  %


 


MCV   (80.0-100.0)  fL


 


MCH   (25.0-35.0)  pg


 


RDW   (11.5-15.5)  %


 


Plt Count   (150-450)  k/uL


 


Lymphocytes #   (1.0-4.8)  k/uL


 


Monocytes #   (0-1.0)  k/uL


 


Macrocytosis   


 


PT   (9.0-12.0)  sec


 


INR   (<1.2)  


 


Sodium   (137-145)  mmol/L


 


Chloride   ()  mmol/L


 


Carbon Dioxide   (22-30)  mmol/L


 


BUN   (9-20)  mg/dL


 


Creatinine   (0.66-1.25)  mg/dL


 


POC Glucose (mg/dL)   (75-99)  mg/dL


 


Osmolality   (280-301)  mosm/kg


 


Plasma Lactic Acid Evangelista  5.4 H*  (0.7-2.0)  mmol/L


 


Calcium   (8.4-10.2)  mg/dL


 


Total Bilirubin   (0.2-1.3)  mg/dL


 


AST   (17-59)  U/L


 


ALT   (4-49)  U/L


 


Alkaline Phosphatase   ()  U/L


 


Ammonia   (<30)  umol/L


 


Total Protein   (6.3-8.2)  g/dL


 


Albumin   (3.5-5.0)  g/dL


 


Ur Random Sodium   ()  mmol/L








                      Microbiology - Last 24 Hours (Table)











 05/05/22 03:15 Urine Culture - Preliminary





 Urine,Voided 














Assessment and Plan


Plan: 





Assessment: 


1.  Acute kidney injury secondary to ATN - concern for pigment nephropathy due 

to elevated bilirubin as well as hepatorenal syndrome.  No hydronephrosis noted 

on CAT scan.  Creatinine in December 2021 was 0.79 and 1.77 on admission - 2.86 

today.


2.  Metabolic acidosis secondary to acute kidney injury and lactic acidosis.


3.  Acute liver failure with ascites.  CAT scan shows fatty infiltration of the 

liver.  GI following. 


4.  Hyponatremia.  Hypervolemic.


5.  Hepatic encephalopathy.


6.  Hypomagnesemia from poor intake.  Replaced.


7.  Benign hypertension.  Controlled.





Plan:


Patient currently off IV fluids.


Add Lasix 40 mg IV daily.


Add spironolactone 25 mg twice daily.


1500 mL fluid resection.


Low-salt diet.


Paracentesis pending.  I will give him albumin pre-and post procedure.


Continue to hold lisinopril.


Hold antihypertensives for systolic blood pressure less than 120.


Avoid nephrotoxins.


Awaits transfer to tertiary center for hepatology eval.

## 2022-05-06 NOTE — US
EXAMINATION TYPE: US abdomen limited

 

DATE OF EXAM: 5/6/2022

 

COMPARISON: CT

 

CLINICAL HISTORY: ascites.

 

Small amount of ascites noted.

 

 

 

IMPRESSION:  As above

## 2022-11-04 NOTE — P.PN
Subjective


Progress Note Date: 05/06/22





CHIEF COMPLAINT: Weakness and jaundice





HISTORY OF PRESENT ILLNESS: Patient did require transfer to the ICU yesterday.  

He is still jaundice with a total bilirubin of 28.9 he did have 2 episodes of 

vomiting yesterday.  He reports no lower abdominal pain.  Surgical service was 

contacted in regards to appendicitis.  No evidence of an acute appendicitis 

noted.  Afebrile.  WBC is 5.4 hemoglobin 11.4 platelets are 45 INR is 2.8 sodium

is 126 total bilirubin 28.9 LFTs trending downwards.  Due to elevated INR 

patient unable to have paracentesis.  Also they are working on transferring 

patient to [facility]





PHYSICAL EXAM: 


VITAL SIGNS: Reviewed


GENERAL: Well-developed in no acute distress. 


HEENT:  No sclera icterus. Extraocular movements grossly intact.  Moist buccal 

mucosa. 


Head is atraumatic, normocephalic. Hears conversational speech. No nasal 

drainage.


NECK:  Supple without lymphadenopathy.


CHEST:  Non-labored respirations and equal bilateral excursions. 


CARDIOVASCULAR:   Palpable 2+ radial pulses.


ABDOMEN:  Soft.  Nondistended. Nontender.


MUSCULOSKELETAL:  No clubbing or cyanosis.


NEUROLOGIC:  No focal or lateralizing signs.  Cranial nerves II through XII 

grossly intact.


PSYCH:  Appropriate affect.  Alert and oriented to person, place and time.


SKIN: Well perfused.  Good skin turgor. 





ASSESSMENT: 


1.  Jaundice


2. Liver failure, acute


3. History of heavy alcohol abuse


4. Gallstones


5. Coagulopathy due to liver failure


6. AAA


7. Hypertensive heart disease


8. Morbid obesity due to excess calories, BMI 39.9


9. Hypoglycemia


10. Lactic acidosis








PLAN: 


-CT of the abdomen reviewed with patent appendix including clinically he has no 

right lower quadrant abdominal pain and no leukocytosis. No clinical findings of

appendicitis.


-He has gallstones in the setting of acute liver failure. Conservative 

management for now. No acute surgical intervention for asymptomatic gallstones.


-IV fluid hydration for lactic acidosis


-Continue ICU management and supportive care.  Agree with transferring patient 

to higher level of care 


-Surgical service will sign off.  Please call with any questions or concerns





Physician Assistant note has been reviewed by physician. Signing provider agrees

with the documented findings, assessment, and plan of care. 























CHIEF COMPLAINT: Abnormal computed tomography scan for appendicitis





HISTORY OF PRESENT ILLNESS: The patient is a 52 year old male admitted with 

abdominal distention including jaundice.  Diagnostic imaging demonstrated 

features of possible appendicitis.  He also presented with acute liver failure 

with total bilirubin over 20.  He has been transferred to intensive care unit  

for management of lactic acidosis.  He also has tense ascites.





REVIEW OF ORGAN SYSTEMS: No fevers or chills, no chest pain, no shortness of 

breath.








PHYSICAL EXAM:


VITALS: Reviewed


CONSTITUTIONAL:  Well developed and in no acute distress. 


EYES:  Conjuctivae with sclera icterus.  Extraocular movements grossly intact. 


HEAD, EARS, NOSE, THROAT: Moist buccal mucosa. Head is atraumatic, 

normocephalic. No nasal drainage. 


RESPIRATORY: Labored respirations and equal bilateral excursions. No gross 

wheezes. 


CARDIOVASCULAR:  Palpable 2+ radial pulses.


ABDOMEN:  Has tense ascites.  No right lower quadrant abdominal pain.


MUSCULOSKELETAL:  No clubbing cyanosis.


SKIN:  Well perfused with good skin turgor. Has jaundice.


NEUROLOGIC: Cranial nerves II through XII grossly intact. Lethargic.


PSYCH: Oriented to person. Flat affect.  Lethargic.





CLINCAL LABS: Reviewed.  INR down from 3.7-2.8.  Lactic acid down from 13.4-5.4.

 Total bilirubin elevated 28.9.





ASSESSMENT: 


1.  Jaundice


2. Liver failure, acute


3. History of heavy alcohol abuse


4. Gallstones


5. Coagulopathy due to liver failure


6. AAA


7. Hypertensive heart disease


8. Morbid obesity due to excess calories, BMI 39.9


9. Hypoglycemia


10. Lactic acidosis





PLAN: 


1.  No surgical intervention as patient has no features of appendicitis


2.  Patient be transferred to tertiary care center due to acute liver failure





Objective





- Vital Signs


Vital signs: 


                                   Vital Signs











Temp  98.3 F   05/06/22 12:00


 


Pulse  90   05/06/22 12:00


 


Resp  20   05/06/22 12:00


 


BP  112/62   05/06/22 12:00


 


Pulse Ox  95   05/06/22 12:00








                                 Intake & Output











 05/05/22 05/06/22 05/06/22





 18:59 06:59 18:59


 


Intake Total 1756 1060 620


 


Output Total 90 965 190


 


Balance 1666 95 430


 


Weight 115.666 kg 126.4 kg 


 


Intake:   


 


   640 120


 


    0.9@20mls/hr  140 120


 


    Sodium Chloride 0.9% 500 500 500 





    ml 500 ml @ 999 mls/hr IV   





    .Q31M ONE Rx#:[id number]   


 


  Intake, IV Titration  200 





  Amount   


 


    Piperacillin-Tazobactam 3  100 





    .375 gm In Sodium   





    Chloride 0.9% 100 ml @ 25   





    mls/hr IVPB Q8H [facility] Rx#:   





    [id number]   


 


    Piperacillin-Tazobactam 3  100 





    .375 gm In Sodium   





    Chloride 0.9% 100 ml @ 25   





    mls/hr IVPB Q8HR [facility] Rx#   





    [id number]   


 


  Oral  220 500


 


  Blood Product 1256  


 


    Ffp 24 Cpd  Unit 325  





    [id number]   


 


    Ffp 24 Cpd  Unit 303  





    [id number]   


 


Output:   


 


  Urine 90 665 190


 


    Straight 25  


 


  Emesis  300 


 


Other:   


 


  Voiding Method Indwelling Catheter Indwelling Catheter Indwelling Catheter


 


  # Bowel Movements  1 














- Labs


CBC & Chem 7: 


                                 05/06/22 06:54





                                 05/06/22 06:54


Labs: 


                  Abnormal Lab Results - Last 24 Hours (Table)











  05/05/22 05/05/22 05/05/22 Range/Units





  13:47 14:13 14:21 


 


RBC     (4.30-5.90)  m/uL


 


Hgb     (13.0-17.5)  gm/dL


 


Hct     (39.0-53.0)  %


 


MCV     (80.0-100.0)  fL


 


MCH     (25.0-35.0)  pg


 


RDW     (11.5-15.5)  %


 


Plt Count     (150-450)  k/uL


 


Macrocytosis     


 


PT     (9.0-12.0)  sec


 


INR     (<1.2)  


 


Sodium     (137-145)  mmol/L


 


Chloride     ()  mmol/L


 


BUN     (9-20)  mg/dL


 


Creatinine     (0.66-1.25)  mg/dL


 


POC Glucose (mg/dL)  115 H    (75-99)  mg/dL


 


Osmolality   264 L   (280-301)  mosm/kg


 


Plasma Lactic Acid Evangelista    11.2 H*  (0.7-2.0)  mmol/L


 


Total Bilirubin     (0.2-1.3)  mg/dL


 


AST     (17-59)  U/L


 


ALT     (4-49)  U/L


 


Alkaline Phosphatase     ()  U/L


 


Ammonia     (<30)  umol/L


 


Total Protein     (6.3-8.2)  g/dL


 


Albumin     (3.5-5.0)  g/dL


 


Ur Random Sodium     ()  mmol/L














  05/05/22 05/05/22 05/05/22 Range/Units





  14:45 18:54 21:22 


 


RBC     (4.30-5.90)  m/uL


 


Hgb     (13.0-17.5)  gm/dL


 


Hct     (39.0-53.0)  %


 


MCV     (80.0-100.0)  fL


 


MCH     (25.0-35.0)  pg


 


RDW     (11.5-15.5)  %


 


Plt Count     (150-450)  k/uL


 


Macrocytosis     


 


PT     (9.0-12.0)  sec


 


INR     (<1.2)  


 


Sodium     (137-145)  mmol/L


 


Chloride     ()  mmol/L


 


BUN     (9-20)  mg/dL


 


Creatinine     (0.66-1.25)  mg/dL


 


POC Glucose (mg/dL)     (75-99)  mg/dL


 


Osmolality     (280-301)  mosm/kg


 


Plasma Lactic Acid Evangelista   8.1 H*  7.3 H*  (0.7-2.0)  mmol/L


 


Total Bilirubin     (0.2-1.3)  mg/dL


 


AST     (17-59)  U/L


 


ALT     (4-49)  U/L


 


Alkaline Phosphatase     ()  U/L


 


Ammonia     (<30)  umol/L


 


Total Protein     (6.3-8.2)  g/dL


 


Albumin     (3.5-5.0)  g/dL


 


Ur Random Sodium  <20 L    ()  mmol/L














  05/06/22 05/06/22 05/06/22 Range/Units





  00:00 04:00 06:54 


 


RBC     (4.30-5.90)  m/uL


 


Hgb     (13.0-17.5)  gm/dL


 


Hct     (39.0-53.0)  %


 


MCV     (80.0-100.0)  fL


 


MCH     (25.0-35.0)  pg


 


RDW     (11.5-15.5)  %


 


Plt Count     (150-450)  k/uL


 


Macrocytosis     


 


PT     (9.0-12.0)  sec


 


INR     (<1.2)  


 


Sodium    126 L  (137-145)  mmol/L


 


Chloride    87 L  ()  mmol/L


 


BUN    6 L  (9-20)  mg/dL


 


Creatinine    2.86 H  (0.66-1.25)  mg/dL


 


POC Glucose (mg/dL)     (75-99)  mg/dL


 


Osmolality     (280-301)  mosm/kg


 


Plasma Lactic Acid Evangelista  6.8 H*  6.2 H*   (0.7-2.0)  mmol/L


 


Total Bilirubin    28.9 H*  (0.2-1.3)  mg/dL


 


AST    963 H  (17-59)  U/L


 


ALT    83 H  (4-49)  U/L


 


Alkaline Phosphatase    142 H  ()  U/L


 


Ammonia     (<30)  umol/L


 


Total Protein    6.1 L  (6.3-8.2)  g/dL


 


Albumin    2.3 L  (3.5-5.0)  g/dL


 


Ur Random Sodium     ()  mmol/L














  05/06/22 05/06/22 05/06/22 Range/Units





  06:54 06:54 06:54 


 


RBC  2.92 L    (4.30-5.90)  m/uL


 


Hgb  11.4 L    (13.0-17.5)  gm/dL


 


Hct  34.5 L    (39.0-53.0)  %


 


MCV  118.0 H D    (80.0-100.0)  fL


 


MCH  39.0 H    (25.0-35.0)  pg


 


RDW  17.8 H    (11.5-15.5)  %


 


Plt Count  45 L    (150-450)  k/uL


 


Macrocytosis  Marked A    


 


PT    28.3 H  (9.0-12.0)  sec


 


INR    2.8 H  (<1.2)  


 


Sodium     (137-145)  mmol/L


 


Chloride     ()  mmol/L


 


BUN     (9-20)  mg/dL


 


Creatinine     (0.66-1.25)  mg/dL


 


POC Glucose (mg/dL)     (75-99)  mg/dL


 


Osmolality     (280-301)  mosm/kg


 


Plasma Lactic Acid Evangelista     (0.7-2.0)  mmol/L


 


Total Bilirubin     (0.2-1.3)  mg/dL


 


AST     (17-59)  U/L


 


ALT     (4-49)  U/L


 


Alkaline Phosphatase     ()  U/L


 


Ammonia   47 H   (<30)  umol/L


 


Total Protein     (6.3-8.2)  g/dL


 


Albumin     (3.5-5.0)  g/dL


 


Ur Random Sodium     ()  mmol/L














  05/06/22 Range/Units





  06:54 


 


RBC   (4.30-5.90)  m/uL


 


Hgb   (13.0-17.5)  gm/dL


 


Hct   (39.0-53.0)  %


 


MCV   (80.0-100.0)  fL


 


MCH   (25.0-35.0)  pg


 


RDW   (11.5-15.5)  %


 


Plt Count   (150-450)  k/uL


 


Macrocytosis   


 


PT   (9.0-12.0)  sec


 


INR   (<1.2)  


 


Sodium   (137-145)  mmol/L


 


Chloride   ()  mmol/L


 


BUN   (9-20)  mg/dL


 


Creatinine   (0.66-1.25)  mg/dL


 


POC Glucose (mg/dL)   (75-99)  mg/dL


 


Osmolality   (280-301)  mosm/kg


 


Plasma Lactic Acid Evangelista  5.4 H*  (0.7-2.0)  mmol/L


 


Total Bilirubin   (0.2-1.3)  mg/dL


 


AST   (17-59)  U/L


 


ALT   (4-49)  U/L


 


Alkaline Phosphatase   ()  U/L


 


Ammonia   (<30)  umol/L


 


Total Protein   (6.3-8.2)  g/dL


 


Albumin   (3.5-5.0)  g/dL


 


Ur Random Sodium   ()  mmol/L








                      Microbiology - Last 24 Hours (Table)











 05/05/22 03:15 Urine Culture - Final





 Urine,Voided English